# Patient Record
Sex: MALE | Race: WHITE | Employment: UNEMPLOYED | URBAN - METROPOLITAN AREA
[De-identification: names, ages, dates, MRNs, and addresses within clinical notes are randomized per-mention and may not be internally consistent; named-entity substitution may affect disease eponyms.]

---

## 2017-12-04 ENCOUNTER — HOSPITAL ENCOUNTER (OUTPATIENT)
Dept: OTHER | Age: 22
Discharge: HOME | End: 2017-12-04
Attending: NURSE PRACTITIONER | Admitting: NURSE PRACTITIONER

## 2017-12-04 DIAGNOSIS — M25.561 RIGHT KNEE PAIN, UNSPECIFIED CHRONICITY: ICD-10-CM

## 2018-07-10 ENCOUNTER — HOSPITAL ENCOUNTER (OUTPATIENT)
Dept: GENERAL RADIOLOGY | Age: 23
Discharge: HOME | End: 2018-07-10
Attending: PSYCHIATRY & NEUROLOGY | Admitting: PSYCHIATRY & NEUROLOGY

## 2018-07-19 LAB — MISCELLANEOUS LAB TEST ORDER: NORMAL

## 2021-04-22 ENCOUNTER — HOSPITAL ENCOUNTER (EMERGENCY)
Age: 26
Discharge: HOME | End: 2021-04-22
Attending: EMERGENCY MEDICINE
Payer: MEDICAID

## 2021-04-22 ENCOUNTER — APPOINTMENT (OUTPATIENT)
Dept: GENERAL RADIOLOGY | Age: 26
End: 2021-04-22
Payer: MEDICAID

## 2021-04-22 VITALS
TEMPERATURE: 98.2 F | WEIGHT: 135 LBS | RESPIRATION RATE: 16 BRPM | HEIGHT: 67 IN | BODY MASS INDEX: 21.19 KG/M2 | HEART RATE: 76 BPM | SYSTOLIC BLOOD PRESSURE: 128 MMHG | DIASTOLIC BLOOD PRESSURE: 83 MMHG | OXYGEN SATURATION: 98 %

## 2021-04-22 DIAGNOSIS — R07.89 ATYPICAL CHEST PAIN: Primary | ICD-10-CM

## 2021-04-22 LAB
A/G RATIO: 2.5 (ref 1.1–2.2)
ALBUMIN SERPL-MCNC: 4.7 G/DL (ref 3.4–5)
ALP BLD-CCNC: 60 U/L (ref 40–129)
ALT SERPL-CCNC: 30 U/L (ref 10–40)
ANION GAP SERPL CALCULATED.3IONS-SCNC: 6 MMOL/L (ref 3–16)
AST SERPL-CCNC: 19 U/L (ref 15–37)
BASOPHILS ABSOLUTE: 0 K/UL (ref 0–0.2)
BASOPHILS RELATIVE PERCENT: 0.5 %
BILIRUB SERPL-MCNC: 0.3 MG/DL (ref 0–1)
BUN BLDV-MCNC: 9 MG/DL (ref 7–20)
CALCIUM SERPL-MCNC: 9.1 MG/DL (ref 8.3–10.6)
CHLORIDE BLD-SCNC: 106 MMOL/L (ref 99–110)
CO2: 29 MMOL/L (ref 21–32)
CREAT SERPL-MCNC: 0.8 MG/DL (ref 0.9–1.3)
EOSINOPHILS ABSOLUTE: 0 K/UL (ref 0–0.6)
EOSINOPHILS RELATIVE PERCENT: 0.3 %
GFR AFRICAN AMERICAN: >60
GFR NON-AFRICAN AMERICAN: >60
GLOBULIN: 1.9 G/DL
GLUCOSE BLD-MCNC: 95 MG/DL (ref 70–99)
HCT VFR BLD CALC: 43.1 % (ref 40.5–52.5)
HEMOGLOBIN: 14.7 G/DL (ref 13.5–17.5)
LYMPHOCYTES ABSOLUTE: 1.9 K/UL (ref 1–5.1)
LYMPHOCYTES RELATIVE PERCENT: 25.9 %
MCH RBC QN AUTO: 30.6 PG (ref 26–34)
MCHC RBC AUTO-ENTMCNC: 34 G/DL (ref 31–36)
MCV RBC AUTO: 89.8 FL (ref 80–100)
MONOCYTES ABSOLUTE: 0.3 K/UL (ref 0–1.3)
MONOCYTES RELATIVE PERCENT: 4.4 %
NEUTROPHILS ABSOLUTE: 5.2 K/UL (ref 1.7–7.7)
NEUTROPHILS RELATIVE PERCENT: 68.9 %
PDW BLD-RTO: 12.7 % (ref 12.4–15.4)
PLATELET # BLD: 226 K/UL (ref 135–450)
PMV BLD AUTO: 8.4 FL (ref 5–10.5)
POTASSIUM SERPL-SCNC: 3.6 MMOL/L (ref 3.5–5.1)
RBC # BLD: 4.8 M/UL (ref 4.2–5.9)
SODIUM BLD-SCNC: 141 MMOL/L (ref 136–145)
TOTAL PROTEIN: 6.6 G/DL (ref 6.4–8.2)
TROPONIN: <0.01 NG/ML
WBC # BLD: 7.5 K/UL (ref 4–11)

## 2021-04-22 PROCEDURE — 96374 THER/PROPH/DIAG INJ IV PUSH: CPT

## 2021-04-22 PROCEDURE — 93005 ELECTROCARDIOGRAM TRACING: CPT | Performed by: EMERGENCY MEDICINE

## 2021-04-22 PROCEDURE — 71046 X-RAY EXAM CHEST 2 VIEWS: CPT

## 2021-04-22 PROCEDURE — 85025 COMPLETE CBC W/AUTO DIFF WBC: CPT

## 2021-04-22 PROCEDURE — 99284 EMERGENCY DEPT VISIT MOD MDM: CPT

## 2021-04-22 PROCEDURE — 80053 COMPREHEN METABOLIC PANEL: CPT

## 2021-04-22 PROCEDURE — 84484 ASSAY OF TROPONIN QUANT: CPT

## 2021-04-22 PROCEDURE — 6360000002 HC RX W HCPCS: Performed by: EMERGENCY MEDICINE

## 2021-04-22 RX ORDER — CETIRIZINE HYDROCHLORIDE 10 MG/1
10 TABLET ORAL DAILY
COMMUNITY

## 2021-04-22 RX ORDER — ARIPIPRAZOLE 10 MG/1
10 TABLET ORAL DAILY
COMMUNITY

## 2021-04-22 RX ORDER — OXYBUTYNIN CHLORIDE 5 MG/1
2.5 TABLET ORAL 2 TIMES DAILY
COMMUNITY

## 2021-04-22 RX ORDER — CLONAZEPAM 1 MG/1
1 TABLET ORAL 2 TIMES DAILY PRN
COMMUNITY

## 2021-04-22 RX ORDER — CLONIDINE 0.2 MG/24H
1 PATCH, EXTENDED RELEASE TRANSDERMAL WEEKLY
COMMUNITY

## 2021-04-22 RX ORDER — TRAZODONE HYDROCHLORIDE 50 MG/1
50 TABLET ORAL NIGHTLY
COMMUNITY

## 2021-04-22 RX ORDER — KETOROLAC TROMETHAMINE 30 MG/ML
30 INJECTION, SOLUTION INTRAMUSCULAR; INTRAVENOUS ONCE
Status: COMPLETED | OUTPATIENT
Start: 2021-04-22 | End: 2021-04-22

## 2021-04-22 RX ORDER — FLUTICASONE PROPIONATE 110 UG/1
1 AEROSOL, METERED RESPIRATORY (INHALATION) 2 TIMES DAILY
COMMUNITY

## 2021-04-22 RX ADMIN — KETOROLAC TROMETHAMINE 30 MG: 30 INJECTION, SOLUTION INTRAMUSCULAR; INTRAVENOUS at 18:23

## 2021-04-22 ASSESSMENT — PAIN SCALES - GENERAL: PAINLEVEL_OUTOF10: 6

## 2021-04-22 ASSESSMENT — PAIN DESCRIPTION - DESCRIPTORS: DESCRIPTORS: BURNING

## 2021-04-22 ASSESSMENT — HEART SCORE: ECG: 0

## 2021-04-22 ASSESSMENT — PAIN DESCRIPTION - PAIN TYPE: TYPE: ACUTE PAIN

## 2021-04-22 NOTE — ED NOTES
Pt ok to d/c to home. Pt given d/c instructions. Pt verbalized understating including Rx and follow up care. Pt ambulated to lobby for ride home.  0 s/s of distress at time of d/c.        Daina Westfall RN  04/22/21 8624

## 2021-04-22 NOTE — ED NOTES
Bed: 21  Expected date:   Expected time:   Means of arrival:   Comments:  [facility]  04/22/21 3382

## 2021-04-22 NOTE — ED PROVIDER NOTES
CHIEF COMPLAINT  Chest Pain (chest pain/burning sensation in chest x 3-4 hours )      HISTORY OF PRESENT ILLNESS  lAonso Menjivar is a 26 y.o. male with a history of asthma and ADHD who presents to the ED complaining of chest pain. Patient reports onset of subjective numbness and tingling of his entire face and bilateral chest wall 3 to 4 hours prior to arrival.  Subsequently developed substernal chest discomfort described as burning, 6 out of 10 in intensity. Denies any exacerbating or relieving factors. No prior cardiac history although states he does have a positive family history. Denies diaphoresis, palpitations, near syncope, nausea, vomiting, fever, chills, abdominal pain. No report of recent illness or trauma. States he has been under significant stress since being fired from his job. No other complaints, modifying factors or associated symptoms. I have reviewed the following from the nursing documentation. Past Medical History:   Diagnosis Date    ADHD (attention deficit hyperactivity disorder)     Asthma     [place]'s disease ([facility] [address]      Past Surgical History:   Procedure Laterality Date    HERNIA REPAIR       No family history on file.   Social History     Socioeconomic History    Marital status: Single     Spouse name: Not on file    Number of children: Not on file    Years of education: Not on file    Highest education level: Not on file   Occupational History    Not on file   Social Needs    Financial resource strain: Not on file    Food insecurity     Worry: Not on file     Inability: Not on file    Transportation needs     Medical: Not on file     Non-medical: Not on file   Tobacco Use    Smoking status: Never Smoker    Smokeless tobacco: Never Used   Substance and Sexual Activity    Alcohol use: No    Drug use: No    Sexual activity: Not on file   Lifestyle    Physical activity     Days per week: Not on file     Minutes per session: Not on file    Stress: Not on file   Relationships    Social connections     Talks on phone: Not on file     Gets together: Not on file     Attends Amish service: Not on file     Active member of club or organization: Not on file     Attends meetings of clubs or organizations: Not on file     Relationship status: Not on file    Intimate partner violence     Fear of current or ex partner: Not on file     Emotionally abused: Not on file     Physically abused: Not on file     Forced sexual activity: Not on file   Other Topics Concern    Not on file   Social History Narrative    ** Merged History Encounter **          No current facility-administered medications for this encounter. Current Outpatient Medications   Medication Sig Dispense Refill    traZODone (DESYREL) 50 MG tablet Take 50 mg by mouth nightly      fluticasone (FLOVENT HFA) 110 MCG/ACT inhaler Inhale 1 puff into the lungs 2 times daily      oxybutynin (DITROPAN) 5 MG tablet Take 2.5 mg by mouth 2 times daily      ARIPiprazole (ABILIFY) 10 MG tablet Take 10 mg by mouth daily      cloNIDine (CATAPRES) 0.2 MG/24HR PTWK Place 1 patch onto the skin once a week      clonazePAM (KLONOPIN) 1 MG tablet Take 1 mg by mouth 2 times daily as needed.  diclofenac (VOLTAREN) 50 MG EC tablet Take 1 tablet by mouth 2 times daily 14 tablet 0    Dexmethylphenidate HCl (FOCALIN PO) Take 1 tablet by mouth 2 times daily. 30mg daily and 5mg bid      OXcarbazepine (TRILEPTAL) 150 MG tablet Take 600 mg by mouth 2 times daily 450mg bid      albuterol (PROVENTIL HFA;VENTOLIN HFA) 108 (90 BASE) MCG/ACT inhaler Inhale 2 puffs into the lungs every 6 hours as needed for Wheezing.  cetirizine (ZYRTEC) 10 MG tablet Take 10 mg by mouth daily       Allergies   Allergen Reactions    Other      Ucerine Cream        REVIEW OF SYSTEMS  10 systems reviewed, pertinent positives per HPI otherwise noted to be negative.     PHYSICAL EXAM  BP (!) 144/79   Pulse 74   Temp 98.2 °F (36.8 °C) (Oral)   Resp 16   Ht 5' 7\" (1.702 m)   Wt 135 lb (61.2 kg)   SpO2 100%   BMI 21.14 kg/m²   GENERAL APPEARANCE: Awake and alert. Cooperative. No acute distress. HEAD: Normocephalic. Atraumatic. EYES: PERRL. EOM's grossly intact. ENT: Mucous membranes are moist.   NECK: Supple, trachea midline. HEART: RRR. Normal S1S2, no rubs, gallops, or murmurs noted. Mild anterior chest wall tenderness. LUNGS: Respirations unlabored. CTAB. Good air exchange. No wheezes, rales, or rhonchi. Speaking comfortably in full sentences. ABDOMEN: Soft. Non-distended. Non-tender. No guarding or rebound. Normal bowel sounds. EXTREMITIES: No peripheral edema. MAEE. No acute deformities. SKIN: Warm and dry. No acute rashes. NEUROLOGICAL: Alert and oriented X 3. CN II-XII intact. No gross facial drooping. Strength 5/5, sensation intact. Normal coordination. No pronator drift. No truncal instability. Gait normal.   PSYCHIATRIC: Slightly anxious, somewhat bizarre affect. LABS  I have reviewed all labs for this visit.    Results for orders placed or performed during the hospital encounter of 04/22/21   CBC auto differential   Result Value Ref Range    WBC 7.5 4.0 - 11.0 K/uL    RBC 4.80 4.20 - 5.90 M/uL    Hemoglobin 14.7 13.5 - 17.5 g/dL    Hematocrit 43.1 40.5 - 52.5 %    MCV 89.8 80.0 - 100.0 fL    MCH 30.6 26.0 - 34.0 pg    MCHC 34.0 31.0 - 36.0 g/dL    RDW 12.7 12.4 - 15.4 %    Platelets 419 727 - 471 K/uL    MPV 8.4 5.0 - 10.5 fL    Neutrophils % 68.9 %    Lymphocytes % 25.9 %    Monocytes % 4.4 %    Eosinophils % 0.3 %    Basophils % 0.5 %    Neutrophils Absolute 5.2 1.7 - 7.7 K/uL    Lymphocytes Absolute 1.9 1.0 - 5.1 K/uL    Monocytes Absolute 0.3 0.0 - 1.3 K/uL    Eosinophils Absolute 0.0 0.0 - 0.6 K/uL    Basophils Absolute 0.0 0.0 - 0.2 K/uL   Comprehensive metabolic panel   Result Value Ref Range    Sodium 141 136 - 145 mmol/L    Potassium 3.6 3.5 - 5.1 mmol/L    Chloride 106 99 - 110 mmol/L    CO2 29 21 - 32 mmol/L    Anion Gap 6 3 - 16    Glucose 95 70 - 99 mg/dL    BUN 9 7 - 20 mg/dL    CREATININE 0.8 (L) 0.9 - 1.3 mg/dL    GFR Non-African American >60 >60    GFR African American >60 >60    Calcium 9.1 8.3 - 10.6 mg/dL    Total Protein 6.6 6.4 - 8.2 g/dL    Albumin 4.7 3.4 - 5.0 g/dL    Albumin/Globulin Ratio 2.5 (H) 1.1 - 2.2    Total Bilirubin 0.3 0.0 - 1.0 mg/dL    Alkaline Phosphatase 60 40 - 129 U/L    ALT 30 10 - 40 U/L    AST 19 15 - 37 U/L    Globulin 1.9 g/dL   Troponin   Result Value Ref Range    Troponin <0.01 <0.01 ng/mL   EKG 12 Lead   Result Value Ref Range    Ventricular Rate 82 BPM    Atrial Rate 82 BPM    P-R Interval 140 ms    QRS Duration 102 ms    Q-T Interval 358 ms    QTc Calculation (Bazett) 418 ms    P Axis 68 degrees    R Axis 93 degrees    T Axis 70 degrees    Diagnosis       Normal sinus rhythmRightward axisPulmonary disease patternIncomplete right bundle branch blockAbnormal ECGNo previous ECGs available       EKG  Normal sinus rhythm, rate 82, rightward axis, QTC within normal limits, partial right bundle pattern, no acute ST changes or T wave inversions, no priors available for comparison. RADIOLOGY  X-RAYS:  I have reviewed radiologic plain film image(s). ALL OTHER NON-PLAIN FILM IMAGES SUCH AS CT, ULTRASOUND AND MRI HAVE BEEN READ BY THE RADIOLOGIST. XR CHEST (2 VW)   Final Result   No acute cardiopulmonary disease. Rechecks: Physical assessment performed. Appears comfortable, nontoxic, smiling on reevaluation. ED COURSE/MDM  Patient seen and evaluated. Old records reviewed. Labs and imaging reviewed and results discussed with patient. Patient anxious in appearance, states he has been under significant stress lately. Heart score of 1 and work-up unremarkable. Feeling much better on reevaluation. Plan for PCP follow-up versus return with any concerns. New Prescriptions    No medications on file       CLINICAL IMPRESSION  1.  Atypical chest pain

## 2021-04-23 LAB
EKG ATRIAL RATE: 82 BPM
EKG DIAGNOSIS: NORMAL
EKG P AXIS: 68 DEGREES
EKG P-R INTERVAL: 140 MS
EKG Q-T INTERVAL: 358 MS
EKG QRS DURATION: 102 MS
EKG QTC CALCULATION (BAZETT): 418 MS
EKG R AXIS: 93 DEGREES
EKG T AXIS: 70 DEGREES
EKG VENTRICULAR RATE: 82 BPM

## 2021-04-23 PROCEDURE — 93010 ELECTROCARDIOGRAM REPORT: CPT | Performed by: INTERNAL MEDICINE

## 2021-04-28 ENCOUNTER — HOSPITAL ENCOUNTER (EMERGENCY)
Age: 26
Discharge: HOME | End: 2021-04-28
Attending: EMERGENCY MEDICINE
Payer: MEDICAID

## 2021-04-28 VITALS
RESPIRATION RATE: 14 BRPM | HEART RATE: 66 BPM | OXYGEN SATURATION: 97 % | DIASTOLIC BLOOD PRESSURE: 86 MMHG | SYSTOLIC BLOOD PRESSURE: 122 MMHG | TEMPERATURE: 98 F

## 2021-04-28 DIAGNOSIS — R10.13 DYSPEPSIA: Primary | ICD-10-CM

## 2021-04-28 PROCEDURE — 6370000000 HC RX 637 (ALT 250 FOR IP): Performed by: EMERGENCY MEDICINE

## 2021-04-28 PROCEDURE — 99283 EMERGENCY DEPT VISIT LOW MDM: CPT

## 2021-04-28 PROCEDURE — 99284 EMERGENCY DEPT VISIT MOD MDM: CPT

## 2021-04-28 PROCEDURE — 93005 ELECTROCARDIOGRAM TRACING: CPT | Performed by: EMERGENCY MEDICINE

## 2021-04-28 RX ADMIN — LIDOCAINE HYDROCHLORIDE: 20 SOLUTION ORAL; TOPICAL at 02:17

## 2021-04-28 ASSESSMENT — ENCOUNTER SYMPTOMS
COUGH: 0
BACK PAIN: 0
SHORTNESS OF BREATH: 0
NAUSEA: 0
VOMITING: 0
RHINORRHEA: 0

## 2021-04-28 NOTE — ED PROVIDER NOTES
[facility]  ED  EMERGENCY DEPARTMENT ENCOUNTER      Pt Name: Mary Womack  MRN: [id number]  [place] 1995  Date of evaluation: 4/28/2021  Provider: Roslyn Zamorano MD    CHIEF COMPLAINT       Chief Complaint   Patient presents with    Chest Pain     was here a couple days ago for the same thing. states it is pain then burning. thinks it is stress related and does not want an IV         HISTORY OF PRESENT ILLNESS   (Location/Symptom, Timing/Onset,Context/Setting, Quality, Duration, Modifying Factors, Severity)  Note limiting factors. Mary Womack is a 26 y.o. male who presents to the emergency department for chest pain. The patient states that he feels inside of him is on fire he describes as burning pain he points from the epigastric area and across the chest.. Has been going on for a few hours. He states about an hour prior to the onset he had some fried foods. He had similar symptoms a few days ago when he was told that everything was fine however the pain returned and that concerned him. He did state that he was under a lot of stress lately. Nursing notes were reviewed. REVIEW OF SYSTEMS    (2-9 systems for level 4, 10 or more for level 5)     Review of Systems   Constitutional: Negative for fever. HENT: Negative for rhinorrhea. Eyes: Negative for visual disturbance. Respiratory: Negative for cough and shortness of breath. Gastrointestinal: Negative for nausea and vomiting. Endocrine: Negative for polyuria. Genitourinary: Positive for frequency. Negative for dysuria. Musculoskeletal: Negative for back pain. Skin: Negative for rash. Hematological: Does not bruise/bleed easily.          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[facility] [address]          SURGICALHISTORY       Past Surgical History:   Procedure Laterality Date    HERNIA REPAIR CURRENT MEDICATIONS       Discharge Medication List as of 4/28/2021  3:58 AM      CONTINUE these medications which have NOT CHANGED    Details   traZODone (DESYREL) 50 MG tablet Take 50 mg by mouth nightlyHistorical Med      fluticasone (FLOVENT HFA) 110 MCG/ACT inhaler Inhale 1 puff into the lungs 2 times dailyHistorical Med      oxybutynin (DITROPAN) 5 MG tablet Take 2.5 mg by mouth 2 times dailyHistorical Med      ARIPiprazole (ABILIFY) 10 MG tablet Take 10 mg by mouth dailyHistorical Med      cloNIDine (CATAPRES) 0.2 MG/24HR PTWK Place 1 patch onto the skin once a weekHistorical Med      clonazePAM (KLONOPIN) 1 MG tablet Take 1 mg by mouth 2 times daily as needed. Historical Med      diclofenac (VOLTAREN) 50 MG EC tablet Take 1 tablet by mouth 2 times daily, Disp-14 tablet, R-0Print      Dexmethylphenidate HCl (FOCALIN PO) Take 1 tablet by mouth 2 times daily. 30mg daily and 5mg bid      OXcarbazepine (TRILEPTAL) 150 MG tablet Take 600 mg by mouth 2 times daily 450mg bidHistorical Med      albuterol (PROVENTIL HFA;VENTOLIN HFA) 108 (90 BASE) MCG/ACT inhaler Inhale 2 puffs into the lungs every 6 hours as needed for Wheezing. cetirizine (ZYRTEC) 10 MG tablet Take 10 mg by mouth dailyHistorical Med             ALLERGIES     Other    FAMILY HISTORY     History reviewed. No pertinent family history.        SOCIAL HISTORY       Social History     Socioeconomic History    Marital status: Single     Spouse name: None    Number of children: None    Years of education: None    Highest education level: None   Occupational History    None   Social Needs    Financial resource strain: None    Food insecurity     Worry: None     Inability: None    Transportation needs     Medical: None     Non-medical: None   Tobacco Use    Smoking status: Never Smoker    Smokeless tobacco: Never Used   Substance and Sexual Activity    Alcohol use: No    Drug use: No    Sexual activity: None   Lifestyle    Physical activity     Days per week: None     Minutes per session: None    Stress: None   Relationships    Social connections     Talks on phone: None     Gets together: None     Attends Adventist service: None     Active member of club or organization: None     Attends meetings of clubs or organizations: None     Relationship status: None    Intimate partner violence     Fear of current or ex partner: None     Emotionally abused: None     Physically abused: None     Forced sexual activity: None   Other Topics Concern    None   Social History Narrative    ** Merged History Encounter **            SCREENINGS             PHYSICAL EXAM    (up to 7 for level 4, 8 or more for level 5)     ED Triage Vitals [04/28/21 0140]   BP Temp Temp Source Pulse Resp SpO2 Height Weight   (!) 132/104 98 °F (36.7 °C) Oral 74 16 96 % -- --       Physical Exam  Vitals signs and nursing note reviewed. Constitutional:       Appearance: Normal appearance. He is well-developed. He is not ill-appearing. HENT:      Head: Normocephalic and atraumatic. Right Ear: External ear normal.      Left Ear: External ear normal.      Nose: Nose normal.   Eyes:      General: No scleral icterus. Right eye: No discharge. Left eye: No discharge. Conjunctiva/sclera: Conjunctivae normal.   Neck:      Musculoskeletal: Neck supple. Cardiovascular:      Rate and Rhythm: Normal rate and regular rhythm. Heart sounds: Normal heart sounds. Pulmonary:      Effort: Pulmonary effort is normal. No respiratory distress. Breath sounds: Normal breath sounds. No wheezing or rales. Abdominal:      General: Bowel sounds are normal. There is no distension. Palpations: Abdomen is soft. Tenderness: There is abdominal tenderness. There is no guarding or rebound. Comments: Mild epigastric tenderness with no rebound rigidity or guarding. No masses appreciated. No hernias identified. Skin:     Coloration: Skin is not pale. Neurological:      Mental Status: He is alert. Comments: Patient has tics which is normal for him with his [place]'s disease. Psychiatric:      Comments: Anxious             DIAGNOSTIC RESULTS     EKG: All EKG's are interpreted by the Emergency Department Physician who either signs or Co-signs this chart in the absence of a cardiologist.    12 lead EKG shows normal sinus rhythm at a rate of 79 bpm comparing to both QRS QTC normal.  Normal axis no acute ischemic changes. No significant changes from prior EKG on 22 April. RADIOLOGY:   Non-plain film images such as CT, Ultrasound and MRI are read by the radiologist. Plain radiographic images are visualized and preliminarily interpreted by the emergency physician with the below findings:        Interpretation per the Radiologist below, if available at the time of this note:    No orders to display         ED BEDSIDE ULTRASOUND:   Performed by ED Physician - none    LABS:  Labs Reviewed - No data to display    All other labs were within normal range or not returned as of this dictation. EMERGENCY DEPARTMENT COURSE and DIFFERENTIAL DIAGNOSIS/MDM:   Vitals:    Vitals:    04/28/21 0140 04/28/21 0400   BP: (!) 132/104 122/86   Pulse: 74 66   Resp: 16 14   Temp: 98 °F (36.7 °C) 98 °F (36.7 °C)   TempSrc: Oral Oral   SpO2: 96% 97%       Adult male who comes in of burning abdominal and chest pain. The patient is given a GI cocktail. An EKG was ordered and had no acute changes. The patient's chart is reviewed and just a few days ago he had a negative cardiac work-up. Patient is low risk. Patient is reassessed after the GI cocktail his symptoms have resolved he is now resting comfortably. Patient will be discharged I encourage close follow-up in the primary care setting. CRITICAL CARE TIME   None       CONSULTS:  None    PROCEDURES:       Procedures    FINAL IMPRESSION      1.  Dyspepsia          DISPOSITION/PLAN   DISPOSITION Decision To Discharge 04/28/2021 02:55:07 AM      PATIENT REFERREDTO:  Jyoti Grace, APRN - CNP  [address] [address]  [phone]    Schedule an appointment as soon as possible for a visit in 2 days        DISCHARGEMEDICATIONS:  Discharge Medication List as of 4/28/2021  3:58 AM      START taking these medications    Details   Famotidine-Ca Carb-Mag Hydrox -165 MG CHEW Take 1 tablet by mouth daily for 14 days, Disp-14 tablet, R-0Normal                (Please note that portions of this note were completed with a voice recognition program.  Efforts were made to edit the dictations but occasionally words are mis-transcribed.)    Nani Zayas MD (electronically signed)  Attending Emergency Physician        Nani Zayas MD  04/28/21 5972

## 2021-04-28 NOTE — ED NOTES
Bed: 17  Expected date:   Expected time:   Means of arrival:   Comments:  squad Linzie Buerger, RN  04/28/21 5819

## 2021-04-28 NOTE — ED NOTES
Patient given cab voucher with approval from [id number] [facility].  Patient went to lobby to wait for cab at [address] Marcos, RN  04/28/21 0898

## 2021-04-29 LAB
EKG ATRIAL RATE: 79 BPM
EKG DIAGNOSIS: NORMAL
EKG P AXIS: 70 DEGREES
EKG P-R INTERVAL: 142 MS
EKG Q-T INTERVAL: 350 MS
EKG QRS DURATION: 96 MS
EKG QTC CALCULATION (BAZETT): 401 MS
EKG R AXIS: 84 DEGREES
EKG T AXIS: 64 DEGREES
EKG VENTRICULAR RATE: 79 BPM

## 2021-04-29 PROCEDURE — 93010 ELECTROCARDIOGRAM REPORT: CPT | Performed by: INTERNAL MEDICINE

## 2021-06-07 ENCOUNTER — HOSPITAL ENCOUNTER (EMERGENCY)
Age: 26
Discharge: HOME | End: 2021-06-07
Payer: MEDICAID

## 2021-06-07 VITALS
HEART RATE: 75 BPM | HEIGHT: 67 IN | DIASTOLIC BLOOD PRESSURE: 73 MMHG | BODY MASS INDEX: 21.66 KG/M2 | TEMPERATURE: 98.1 F | WEIGHT: 138 LBS | OXYGEN SATURATION: 99 % | SYSTOLIC BLOOD PRESSURE: 121 MMHG | RESPIRATION RATE: 14 BRPM

## 2021-11-15 ENCOUNTER — CLINICAL DOCUMENTATION (OUTPATIENT)
Dept: OTHER | Age: 26
End: 2021-11-15

## 2023-02-06 ENCOUNTER — HOSPITAL ENCOUNTER (INPATIENT)
Age: 28
LOS: 3 days | Discharge: HOME | DRG: 751 | End: 2023-02-09
Attending: PSYCHIATRY & NEUROLOGY | Admitting: PSYCHIATRY & NEUROLOGY
Payer: MEDICAID

## 2023-02-06 DIAGNOSIS — F22 PARANOID DELUSION (HCC): Primary | ICD-10-CM

## 2023-02-06 PROBLEM — F29 PSYCHOSIS, UNSPECIFIED PSYCHOSIS TYPE (HCC): Status: ACTIVE | Noted: 2023-02-06

## 2023-02-06 PROBLEM — G10 HUNTINGTON'S CHOREA (HCC): Status: ACTIVE | Noted: 2023-02-06

## 2023-02-06 LAB
A/G RATIO: 2.7 (ref 1.1–2.2)
ACETAMINOPHEN LEVEL: <5 UG/ML (ref 10–30)
ALBUMIN SERPL-MCNC: 4.6 G/DL (ref 3.4–5)
ALP BLD-CCNC: 112 U/L (ref 40–129)
ALT SERPL-CCNC: 17 U/L (ref 10–40)
AMPHETAMINE SCREEN, URINE: NORMAL
ANION GAP SERPL CALCULATED.3IONS-SCNC: 7 MMOL/L (ref 3–16)
AST SERPL-CCNC: 14 U/L (ref 15–37)
BARBITURATE SCREEN URINE: NORMAL
BASOPHILS ABSOLUTE: 0 K/UL (ref 0–0.2)
BASOPHILS RELATIVE PERCENT: 0.5 %
BENZODIAZEPINE SCREEN, URINE: NORMAL
BILIRUB SERPL-MCNC: <0.2 MG/DL (ref 0–1)
BUN BLDV-MCNC: 9 MG/DL (ref 7–20)
CALCIUM SERPL-MCNC: 8.9 MG/DL (ref 8.3–10.6)
CANNABINOID SCREEN URINE: NORMAL
CHLORIDE BLD-SCNC: 103 MMOL/L (ref 99–110)
CO2: 27 MMOL/L (ref 21–32)
COCAINE METABOLITE SCREEN URINE: NORMAL
CREAT SERPL-MCNC: 0.9 MG/DL (ref 0.9–1.3)
EOSINOPHILS ABSOLUTE: 0.1 K/UL (ref 0–0.6)
EOSINOPHILS RELATIVE PERCENT: 2.3 %
ETHANOL: NORMAL MG/DL (ref 0–0.08)
FENTANYL SCREEN, URINE: NORMAL
GFR SERPL CREATININE-BSD FRML MDRD: >60 ML/MIN/{1.73_M2}
GLUCOSE BLD-MCNC: 111 MG/DL (ref 70–99)
HCT VFR BLD CALC: 35.8 % (ref 40.5–52.5)
HEMOGLOBIN: 12.2 G/DL (ref 13.5–17.5)
INFLUENZA A: NOT DETECTED
INFLUENZA B: NOT DETECTED
LITHIUM DOSE AMOUNT: NORMAL
LITHIUM LEVEL: 0.8 MMOL/L (ref 0.6–1.2)
LYMPHOCYTES ABSOLUTE: 1.6 K/UL (ref 1–5.1)
LYMPHOCYTES RELATIVE PERCENT: 26.3 %
Lab: NORMAL
MAGNESIUM: 2.1 MG/DL (ref 1.8–2.4)
MCH RBC QN AUTO: 29.9 PG (ref 26–34)
MCHC RBC AUTO-ENTMCNC: 34.1 G/DL (ref 31–36)
MCV RBC AUTO: 87.7 FL (ref 80–100)
METHADONE SCREEN, URINE: NORMAL
MONOCYTES ABSOLUTE: 0.3 K/UL (ref 0–1.3)
MONOCYTES RELATIVE PERCENT: 5.2 %
NEUTROPHILS ABSOLUTE: 3.9 K/UL (ref 1.7–7.7)
NEUTROPHILS RELATIVE PERCENT: 65.7 %
OPIATE SCREEN URINE: NORMAL
OXYCODONE URINE: NORMAL
PDW BLD-RTO: 12.7 % (ref 12.4–15.4)
PH UA: 7
PHENCYCLIDINE SCREEN URINE: NORMAL
PLATELET # BLD: 261 K/UL (ref 135–450)
PMV BLD AUTO: 8.4 FL (ref 5–10.5)
POTASSIUM REFLEX MAGNESIUM: 3.4 MMOL/L (ref 3.5–5.1)
RBC # BLD: 4.09 M/UL (ref 4.2–5.9)
SALICYLATE, SERUM: <0.3 MG/DL (ref 15–30)
SARS-COV-2 RNA, RT PCR: NOT DETECTED
SODIUM BLD-SCNC: 137 MMOL/L (ref 136–145)
TOTAL PROTEIN: 6.3 G/DL (ref 6.4–8.2)
TSH SERPL DL<=0.05 MIU/L-ACNC: 0.91 UIU/ML (ref 0.27–4.2)
WBC # BLD: 6 K/UL (ref 4–11)

## 2023-02-06 PROCEDURE — 80307 DRUG TEST PRSMV CHEM ANLYZR: CPT

## 2023-02-06 PROCEDURE — 6370000000 HC RX 637 (ALT 250 FOR IP)

## 2023-02-06 PROCEDURE — 36415 COLL VENOUS BLD VENIPUNCTURE: CPT

## 2023-02-06 PROCEDURE — 80143 DRUG ASSAY ACETAMINOPHEN: CPT

## 2023-02-06 PROCEDURE — 1240000000 HC EMOTIONAL WELLNESS R&B

## 2023-02-06 PROCEDURE — 6370000000 HC RX 637 (ALT 250 FOR IP): Performed by: NURSE PRACTITIONER

## 2023-02-06 PROCEDURE — 82077 ASSAY SPEC XCP UR&BREATH IA: CPT

## 2023-02-06 PROCEDURE — 85025 COMPLETE CBC W/AUTO DIFF WBC: CPT

## 2023-02-06 PROCEDURE — 80179 DRUG ASSAY SALICYLATE: CPT

## 2023-02-06 PROCEDURE — 83735 ASSAY OF MAGNESIUM: CPT

## 2023-02-06 PROCEDURE — 80053 COMPREHEN METABOLIC PANEL: CPT

## 2023-02-06 PROCEDURE — 80178 ASSAY OF LITHIUM: CPT

## 2023-02-06 PROCEDURE — 84443 ASSAY THYROID STIM HORMONE: CPT

## 2023-02-06 PROCEDURE — 99285 EMERGENCY DEPT VISIT HI MDM: CPT

## 2023-02-06 PROCEDURE — 87636 SARSCOV2 & INF A&B AMP PRB: CPT

## 2023-02-06 PROCEDURE — 80183 DRUG SCRN QUANT OXCARBAZEPIN: CPT

## 2023-02-06 PROCEDURE — 80061 LIPID PANEL: CPT

## 2023-02-06 RX ORDER — ACETAMINOPHEN 325 MG/1
650 TABLET ORAL EVERY 4 HOURS PRN
Status: DISCONTINUED | OUTPATIENT
Start: 2023-02-06 | End: 2023-02-09 | Stop reason: HOSPADM

## 2023-02-06 RX ORDER — TRAZODONE HYDROCHLORIDE 50 MG/1
25 TABLET ORAL NIGHTLY
Status: DISCONTINUED | OUTPATIENT
Start: 2023-02-06 | End: 2023-02-09 | Stop reason: HOSPADM

## 2023-02-06 RX ORDER — OXYBUTYNIN CHLORIDE 5 MG/1
2.5 TABLET ORAL 2 TIMES DAILY
Status: DISCONTINUED | OUTPATIENT
Start: 2023-02-06 | End: 2023-02-09 | Stop reason: HOSPADM

## 2023-02-06 RX ORDER — MAGNESIUM HYDROXIDE/ALUMINUM HYDROXICE/SIMETHICONE 120; 1200; 1200 MG/30ML; MG/30ML; MG/30ML
30 SUSPENSION ORAL EVERY 6 HOURS PRN
Status: DISCONTINUED | OUTPATIENT
Start: 2023-02-06 | End: 2023-02-09 | Stop reason: HOSPADM

## 2023-02-06 RX ORDER — OXCARBAZEPINE 300 MG/1
300 TABLET, FILM COATED ORAL 2 TIMES DAILY
COMMUNITY
Start: 2023-02-02

## 2023-02-06 RX ORDER — METHYLPHENIDATE HYDROCHLORIDE 10 MG/1
20 TABLET ORAL 2 TIMES DAILY WITH MEALS
Status: DISCONTINUED | OUTPATIENT
Start: 2023-02-07 | End: 2023-02-07

## 2023-02-06 RX ORDER — CLONIDINE HYDROCHLORIDE 0.2 MG/1
0.2 TABLET ORAL NIGHTLY
Status: DISCONTINUED | OUTPATIENT
Start: 2023-02-06 | End: 2023-02-09 | Stop reason: HOSPADM

## 2023-02-06 RX ORDER — DIPHENHYDRAMINE HYDROCHLORIDE 50 MG/ML
50 INJECTION INTRAMUSCULAR; INTRAVENOUS EVERY 4 HOURS PRN
Status: DISCONTINUED | OUTPATIENT
Start: 2023-02-06 | End: 2023-02-09 | Stop reason: HOSPADM

## 2023-02-06 RX ORDER — CLONIDINE HYDROCHLORIDE 0.2 MG/1
0.2 TABLET ORAL DAILY
Status: DISCONTINUED | OUTPATIENT
Start: 2023-02-07 | End: 2023-02-06

## 2023-02-06 RX ORDER — RISPERIDONE 2 MG/1
2 TABLET ORAL DAILY
Status: DISCONTINUED | OUTPATIENT
Start: 2023-02-07 | End: 2023-02-06

## 2023-02-06 RX ORDER — IBUPROFEN 400 MG/1
400 TABLET ORAL 2 TIMES DAILY
Status: DISCONTINUED | OUTPATIENT
Start: 2023-02-06 | End: 2023-02-09 | Stop reason: HOSPADM

## 2023-02-06 RX ORDER — OLANZAPINE 5 MG/1
5 TABLET ORAL EVERY 4 HOURS PRN
Status: DISCONTINUED | OUTPATIENT
Start: 2023-02-06 | End: 2023-02-09 | Stop reason: HOSPADM

## 2023-02-06 RX ORDER — OMEPRAZOLE 20 MG/1
20 CAPSULE, DELAYED RELEASE ORAL
COMMUNITY

## 2023-02-06 RX ORDER — CLONIDINE HYDROCHLORIDE 0.2 MG/1
0.2 TABLET ORAL DAILY
COMMUNITY
Start: 2023-02-02

## 2023-02-06 RX ORDER — LITHIUM CARBONATE 450 MG
450 TABLET, EXTENDED RELEASE ORAL 2 TIMES DAILY
Status: DISCONTINUED | OUTPATIENT
Start: 2023-02-06 | End: 2023-02-09 | Stop reason: HOSPADM

## 2023-02-06 RX ORDER — PANTOPRAZOLE SODIUM 40 MG/1
40 TABLET, DELAYED RELEASE ORAL
Status: DISCONTINUED | OUTPATIENT
Start: 2023-02-07 | End: 2023-02-09 | Stop reason: HOSPADM

## 2023-02-06 RX ORDER — LITHIUM CARBONATE 450 MG
450 TABLET, EXTENDED RELEASE ORAL 2 TIMES DAILY
COMMUNITY
Start: 2023-02-02

## 2023-02-06 RX ORDER — HYDROXYZINE 50 MG/1
50 TABLET, FILM COATED ORAL 3 TIMES DAILY PRN
Status: DISCONTINUED | OUTPATIENT
Start: 2023-02-06 | End: 2023-02-09 | Stop reason: HOSPADM

## 2023-02-06 RX ORDER — LEVOTHYROXINE SODIUM 0.07 MG/1
75 TABLET ORAL DAILY
COMMUNITY
Start: 2023-02-02

## 2023-02-06 RX ORDER — RISPERIDONE 2 MG/1
2 TABLET ORAL NIGHTLY
Status: DISCONTINUED | OUTPATIENT
Start: 2023-02-06 | End: 2023-02-09 | Stop reason: HOSPADM

## 2023-02-06 RX ORDER — POTASSIUM CHLORIDE 20 MEQ/1
20 TABLET, EXTENDED RELEASE ORAL ONCE
Status: COMPLETED | OUTPATIENT
Start: 2023-02-06 | End: 2023-02-06

## 2023-02-06 RX ORDER — RISPERIDONE 2 MG/1
2 TABLET ORAL DAILY
COMMUNITY
Start: 2023-02-02

## 2023-02-06 RX ORDER — OXCARBAZEPINE 300 MG/1
300 TABLET, FILM COATED ORAL 2 TIMES DAILY
Status: DISCONTINUED | OUTPATIENT
Start: 2023-02-06 | End: 2023-02-09 | Stop reason: HOSPADM

## 2023-02-06 RX ORDER — POLYETHYLENE GLYCOL 3350 17 G
2 POWDER IN PACKET (EA) ORAL
Status: DISCONTINUED | OUTPATIENT
Start: 2023-02-06 | End: 2023-02-09 | Stop reason: HOSPADM

## 2023-02-06 RX ORDER — CETIRIZINE HYDROCHLORIDE 10 MG/1
10 TABLET ORAL DAILY
Status: DISCONTINUED | OUTPATIENT
Start: 2023-02-07 | End: 2023-02-09 | Stop reason: HOSPADM

## 2023-02-06 RX ORDER — TRAZODONE HYDROCHLORIDE 50 MG/1
50 TABLET ORAL NIGHTLY PRN
Status: DISCONTINUED | OUTPATIENT
Start: 2023-02-06 | End: 2023-02-09 | Stop reason: HOSPADM

## 2023-02-06 RX ORDER — DEXMETHYLPHENIDATE HYDROCHLORIDE 10 MG/1
20 TABLET ORAL DAILY
Status: DISCONTINUED | OUTPATIENT
Start: 2023-02-07 | End: 2023-02-06

## 2023-02-06 RX ADMIN — OXCARBAZEPINE 300 MG: 300 TABLET, FILM COATED ORAL at 23:05

## 2023-02-06 RX ADMIN — CLONIDINE HYDROCHLORIDE 0.2 MG: 0.2 TABLET ORAL at 23:14

## 2023-02-06 RX ADMIN — RISPERIDONE 2 MG: 2 TABLET ORAL at 23:05

## 2023-02-06 RX ADMIN — LITHIUM CARBONATE 450 MG: 450 TABLET ORAL at 23:05

## 2023-02-06 RX ADMIN — TRAZODONE HYDROCHLORIDE 25 MG: 50 TABLET ORAL at 23:05

## 2023-02-06 RX ADMIN — POTASSIUM CHLORIDE 20 MEQ: 1500 TABLET, EXTENDED RELEASE ORAL at 19:54

## 2023-02-06 RX ADMIN — OXYBUTYNIN CHLORIDE 2.5 MG: 5 TABLET ORAL at 23:05

## 2023-02-06 SDOH — SOCIAL STABILITY: SOCIAL NETWORK: HOW OFTEN DO YOU ATTEND CHURCH OR RELIGIOUS SERVICES?: MORE THAN 4 TIMES PER YEAR

## 2023-02-06 SDOH — SOCIAL STABILITY: SOCIAL NETWORK: HOW OFTEN DO YOU ATTENT MEETINGS OF THE CLUB OR ORGANIZATION YOU BELONG TO?: NEVER

## 2023-02-06 SDOH — SOCIAL STABILITY: SOCIAL INSECURITY
WITHIN THE LAST YEAR, HAVE TO BEEN RAPED OR FORCED TO HAVE ANY KIND OF SEXUAL ACTIVITY BY YOUR PARTNER OR EX-PARTNER?: NO

## 2023-02-06 SDOH — ECONOMIC STABILITY: FOOD INSECURITY: WITHIN THE PAST 12 MONTHS, THE FOOD YOU BOUGHT JUST DIDN'T LAST AND YOU DIDN'T HAVE MONEY TO GET MORE.: NEVER TRUE

## 2023-02-06 SDOH — SOCIAL STABILITY: SOCIAL NETWORK: ARE YOU MARRIED, WIDOWED, DIVORCED, SEPARATED, NEVER MARRIED, OR LIVING WITH A PARTNER?: NEVER MARRIED

## 2023-02-06 SDOH — HEALTH STABILITY: PHYSICAL HEALTH: ON AVERAGE, HOW MANY MINUTES DO YOU ENGAGE IN EXERCISE AT THIS LEVEL?: 0 MIN

## 2023-02-06 SDOH — SOCIAL STABILITY: SOCIAL INSECURITY
WITHIN THE LAST YEAR, HAVE YOU BEEN KICKED, HIT, SLAPPED, OR OTHERWISE PHYSICALLY HURT BY YOUR PARTNER OR EX-PARTNER?: NO

## 2023-02-06 SDOH — SOCIAL STABILITY: SOCIAL NETWORK: IN A TYPICAL WEEK, HOW MANY TIMES DO YOU TALK ON THE PHONE WITH FAMILY, FRIENDS, OR NEIGHBORS?: THREE TIMES A WEEK

## 2023-02-06 SDOH — HEALTH STABILITY: PHYSICAL HEALTH: ON AVERAGE, HOW MANY DAYS PER WEEK DO YOU ENGAGE IN MODERATE TO STRENUOUS EXERCISE (LIKE A BRISK WALK)?: 0 DAYS

## 2023-02-06 SDOH — ECONOMIC STABILITY: HOUSING INSECURITY: IN THE LAST 12 MONTHS, HOW MANY PLACES HAVE YOU LIVED?: 1

## 2023-02-06 SDOH — SOCIAL STABILITY: SOCIAL NETWORK: HOW OFTEN DO YOU GET TOGETHER WITH FRIENDS OR RELATIVES?: NEVER

## 2023-02-06 SDOH — SOCIAL STABILITY: SOCIAL INSECURITY: WITHIN THE LAST YEAR, HAVE YOU BEEN HUMILIATED OR EMOTIONALLY ABUSED IN OTHER WAYS BY YOUR PARTNER OR EX-PARTNER?: NO

## 2023-02-06 SDOH — SOCIAL STABILITY: SOCIAL NETWORK
DO YOU BELONG TO ANY CLUBS OR ORGANIZATIONS SUCH AS CHURCH GROUPS UNIONS, FRATERNAL OR ATHLETIC GROUPS, OR SCHOOL GROUPS?: NO

## 2023-02-06 SDOH — HEALTH STABILITY: MENTAL HEALTH: HOW OFTEN DO YOU HAVE A DRINK CONTAINING ALCOHOL?: NEVER

## 2023-02-06 SDOH — ECONOMIC STABILITY: INCOME INSECURITY: HOW HARD IS IT FOR YOU TO PAY FOR THE VERY BASICS LIKE FOOD, HOUSING, MEDICAL CARE, AND HEATING?: NOT HARD AT ALL

## 2023-02-06 SDOH — ECONOMIC STABILITY: TRANSPORTATION INSECURITY
IN THE PAST 12 MONTHS, HAS LACK OF TRANSPORTATION KEPT YOU FROM MEETINGS, WORK, OR FROM GETTING THINGS NEEDED FOR DAILY LIVING?: NO

## 2023-02-06 SDOH — ECONOMIC STABILITY: INCOME INSECURITY: IN THE LAST 12 MONTHS, WAS THERE A TIME WHEN YOU WERE NOT ABLE TO PAY THE MORTGAGE OR RENT ON TIME?: NO

## 2023-02-06 SDOH — ECONOMIC STABILITY: TRANSPORTATION INSECURITY
IN THE PAST 12 MONTHS, HAS THE LACK OF TRANSPORTATION KEPT YOU FROM MEDICAL APPOINTMENTS OR FROM GETTING MEDICATIONS?: NO

## 2023-02-06 SDOH — SOCIAL STABILITY: SOCIAL INSECURITY: WITHIN THE LAST YEAR, HAVE YOU BEEN AFRAID OF YOUR PARTNER OR EX-PARTNER?: NO

## 2023-02-06 SDOH — ECONOMIC STABILITY: HOUSING INSECURITY
IN THE LAST 12 MONTHS, WAS THERE A TIME WHEN YOU DID NOT HAVE A STEADY PLACE TO SLEEP OR SLEPT IN A SHELTER (INCLUDING NOW)?: NO

## 2023-02-06 SDOH — HEALTH STABILITY: MENTAL HEALTH
STRESS IS WHEN SOMEONE FEELS TENSE, NERVOUS, ANXIOUS, OR CAN'T SLEEP AT NIGHT BECAUSE THEIR MIND IS TROUBLED. HOW STRESSED ARE YOU?: VERY MUCH

## 2023-02-06 SDOH — HEALTH STABILITY: MENTAL HEALTH: HOW MANY STANDARD DRINKS CONTAINING ALCOHOL DO YOU HAVE ON A TYPICAL DAY?: PATIENT DOES NOT DRINK

## 2023-02-06 SDOH — ECONOMIC STABILITY: FOOD INSECURITY: WITHIN THE PAST 12 MONTHS, YOU WORRIED THAT YOUR FOOD WOULD RUN OUT BEFORE YOU GOT MONEY TO BUY MORE.: NEVER TRUE

## 2023-02-06 ASSESSMENT — PAIN - FUNCTIONAL ASSESSMENT
PAIN_FUNCTIONAL_ASSESSMENT: NONE - DENIES PAIN
PAIN_FUNCTIONAL_ASSESSMENT: ACTIVITIES ARE NOT PREVENTED

## 2023-02-06 ASSESSMENT — ENCOUNTER SYMPTOMS
RHINORRHEA: 0
NAUSEA: 0
SHORTNESS OF BREATH: 0
BLOOD IN STOOL: 0
ABDOMINAL PAIN: 0
SORE THROAT: 0
DIARRHEA: 0
COUGH: 0
BACK PAIN: 0
EYE PAIN: 0
VOMITING: 0

## 2023-02-06 ASSESSMENT — PAIN DESCRIPTION - PAIN TYPE: TYPE: CHRONIC PAIN

## 2023-02-06 ASSESSMENT — PAIN DESCRIPTION - DESCRIPTORS: DESCRIPTORS: POUNDING

## 2023-02-06 ASSESSMENT — PAIN DESCRIPTION - ORIENTATION: ORIENTATION: LEFT

## 2023-02-06 ASSESSMENT — PAIN DESCRIPTION - ONSET: ONSET: ON-GOING

## 2023-02-06 ASSESSMENT — PAIN DESCRIPTION - LOCATION: LOCATION: KNEE

## 2023-02-06 ASSESSMENT — PAIN DESCRIPTION - FREQUENCY: FREQUENCY: CONTINUOUS

## 2023-02-06 NOTE — ED NOTES
Presenting Problem: Patient presents to [facility] on a SOB after he was brought in by mobile crisis.  According to the statement of belief: Sabino is diagnosed with [place]'s Disease which has contributed to psychosis symptoms. He is presenting as highly delusional. He believes his house is bugged and people are out to, \"get him\". He locked and barricaded himself and his grandmother in her apartment and would not let her leave or answer her telephone. His psychiatrist, Dr. Rey at [facility] has consulted with .C. staff for him to be admitted once he is assessed.    Pt presents with elevated mood. He is tangential and has rapid, pressured speech. He presents as guarded and unable to state why police were called today. Pt is somewhat suspicious and unable to answer questions regarding the events of today. He respond to most questions about today's events with, \"I'd rather not say\". He was pleasant and cooperative with staff. He denies that he is suicidal or homicidal and denied all AVH.     Writer spoke with pt's grandmother Jennifer at [phone] for collateral. Grandmother states pt has been escalating over the past few days. He has become increasingly paranoid, argumentative, and delusional. Today, he believed that someone was coming to get him and so he put chairs under the door knobs and locked all the doors. He would not let her access her phone or leave the home. He also believed someone had stolen his medications. Grandmother states pt has not been sleeping recently or caring for his personal hygiene. He has been noncompliant with his medications. She states pt is able to, \"act normal when he is around other people then when he gets by himself he acts up again\". Grandmother believes pt needs to be admitted and stabilized. She does not feel safe for him to return to her home.     Appearance/Hygiene:  hospital attire, seated in bed, fair grooming, and fair hygiene   Motor Behavior: restlessness   Attitude:  cooperative  Affect: elevated affect   Speech: pressured, rapid  Mood: euthymic   Thought Processes: tangential   Perceptions: Absent   Thought content: suspicious    Orientation: A&Ox4   Memory: fair  Concentration: Poor    Insight/ judgement: impaired insight and judgment      Psychosocial and contextual factors: Pt has his own apartment but stays with his grandmother the majority of the time at her place. Pt is currently unemployed and on SSDI for his dx of [place]'s Disease. He describes himself as his grandmother's, \"full time care taker,\" however, also states grandmother has home health care. C-SSRS flowsheet is complete. Psychiatric History (including current outpatient provider and past inpatient admissions): Pt is currently connected in services with . He has psychiatrist, Dr. Zuleika King, and neurologist, Dr. Chang Mac. He is also seen by  Ruth Cox and NP, Jonelle Wetzel at [facility]. Pt had his most recent visit with Dr. Fransisco Garland on 02/03/23. Pt has one previous psychiatric admission at  last year. He reports previous diagnosis of ADHD and states he has been compliant with his medications.      Access to Firearms: Denies    ASSESSMENT FOR IMMINENT FUTURE DANGER:    RISK FACTORS:    []  Age <25 or >49   [x]  Male gender   []  Depressed mood   []  Active suicidal ideation   []  Suicide plan   []  Suicide attempt   []  Access to lethal means   []  Prior suicide attempt   []  Active substance abuse    [x]  Highly impulsive behaviors   []  Not attending to self-care/ADLs    []  Recent significant loss   []  Chronic pain or medical illness   []  Social isolation   []  History of violence    []  Active psychosis   []  Cognitive impairment    []  No outpatient services in place   []  Medication noncompliance   [x]  No collateral information to support safety  [] Self- injurious/ Self-harm behavior    PROTECTIVE FACTORS:  [x] Age >25 and <55  [] Female gender   [x] Denies depression  [x] Denies suicidal ideation  [x] Does not have lethal plan   [x] Does not have access to guns  [x] Patient is verbally shelly for safety  [x] No prior suicide attempts  [x] No active substance abuse  [x] Patient has social or family support  [] No active psychosis or cognitive dysfunction  [] Physically healthy  [x] Has outpatient services in place  [] Compliant with recommended medications  [] Collateral information from. .. supports patient safety   [] Patient is future oriented with plans to. ..               [address] [place] [address]  02/06/23 2021

## 2023-02-07 PROBLEM — F90.0 ATTENTION DEFICIT HYPERACTIVITY DISORDER (ADHD), PREDOMINANTLY INATTENTIVE TYPE: Status: ACTIVE | Noted: 2023-02-07

## 2023-02-07 PROBLEM — J45.909 ASTHMA WITHOUT ACUTE EXACERBATION: Status: ACTIVE | Noted: 2023-02-07

## 2023-02-07 PROBLEM — E03.9 HYPOTHYROIDISM: Status: ACTIVE | Noted: 2023-02-07

## 2023-02-07 PROBLEM — F22 PARANOID DELUSION (HCC): Status: ACTIVE | Noted: 2023-02-07

## 2023-02-07 PROBLEM — K21.9 GASTROESOPHAGEAL REFLUX DISEASE: Status: ACTIVE | Noted: 2023-02-07

## 2023-02-07 PROBLEM — R05.3 CHRONIC COUGH: Status: ACTIVE | Noted: 2023-02-07

## 2023-02-07 PROBLEM — Z00.00 ENCOUNTER FOR ROUTINE ADULT MEDICAL EXAMINATION: Status: ACTIVE | Noted: 2023-02-07

## 2023-02-07 LAB
CHOLESTEROL, TOTAL: 125 MG/DL (ref 0–199)
HDLC SERPL-MCNC: 45 MG/DL (ref 40–60)
LDL CHOLESTEROL CALCULATED: 45 MG/DL
TRIGL SERPL-MCNC: 177 MG/DL (ref 0–150)
VLDLC SERPL CALC-MCNC: 35 MG/DL

## 2023-02-07 PROCEDURE — 6370000000 HC RX 637 (ALT 250 FOR IP)

## 2023-02-07 PROCEDURE — 99222 1ST HOSP IP/OBS MODERATE 55: CPT

## 2023-02-07 PROCEDURE — 1240000000 HC EMOTIONAL WELLNESS R&B

## 2023-02-07 RX ORDER — IBUPROFEN 400 MG/1
400 TABLET ORAL 2 TIMES DAILY
COMMUNITY

## 2023-02-07 RX ORDER — DEXMETHYLPHENIDATE HYDROCHLORIDE 5 MG/1
10 TABLET ORAL 2 TIMES DAILY
Status: DISCONTINUED | OUTPATIENT
Start: 2023-02-08 | End: 2023-02-09 | Stop reason: HOSPADM

## 2023-02-07 RX ADMIN — CETIRIZINE HYDROCHLORIDE 10 MG: 10 TABLET ORAL at 08:19

## 2023-02-07 RX ADMIN — LITHIUM CARBONATE 450 MG: 450 TABLET ORAL at 08:19

## 2023-02-07 RX ADMIN — OXYBUTYNIN CHLORIDE 2.5 MG: 5 TABLET ORAL at 08:19

## 2023-02-07 RX ADMIN — METHYLPHENIDATE HYDROCHLORIDE 20 MG: 10 TABLET ORAL at 08:19

## 2023-02-07 RX ADMIN — ACETAMINOPHEN 650 MG: 325 TABLET ORAL at 08:19

## 2023-02-07 RX ADMIN — ACETAMINOPHEN 650 MG: 325 TABLET ORAL at 22:35

## 2023-02-07 RX ADMIN — PANTOPRAZOLE SODIUM 40 MG: 40 TABLET, DELAYED RELEASE ORAL at 06:44

## 2023-02-07 RX ADMIN — TRAZODONE HYDROCHLORIDE 50 MG: 50 TABLET ORAL at 22:06

## 2023-02-07 RX ADMIN — OXCARBAZEPINE 300 MG: 300 TABLET, FILM COATED ORAL at 08:19

## 2023-02-07 RX ADMIN — OXCARBAZEPINE 300 MG: 300 TABLET, FILM COATED ORAL at 17:26

## 2023-02-07 RX ADMIN — LITHIUM CARBONATE 450 MG: 450 TABLET ORAL at 17:27

## 2023-02-07 RX ADMIN — CLONIDINE HYDROCHLORIDE 0.2 MG: 0.2 TABLET ORAL at 22:06

## 2023-02-07 RX ADMIN — LEVOTHYROXINE SODIUM 75 MCG: 25 TABLET ORAL at 06:44

## 2023-02-07 RX ADMIN — RISPERIDONE 2 MG: 2 TABLET ORAL at 22:06

## 2023-02-07 RX ADMIN — OXYBUTYNIN CHLORIDE 2.5 MG: 5 TABLET ORAL at 22:06

## 2023-02-07 ASSESSMENT — SLEEP AND FATIGUE QUESTIONNAIRES
DO YOU USE A SLEEP AID: YES
SLEEP PATTERN: DIFFICULTY FALLING ASLEEP;NIGHTMARES/TERRORS
DO YOU USE A SLEEP AID: YES
SLEEP PATTERN: DIFFICULTY FALLING ASLEEP;INSOMNIA
DO YOU HAVE DIFFICULTY SLEEPING: YES
AVERAGE NUMBER OF SLEEP HOURS: 6
AVERAGE NUMBER OF SLEEP HOURS: 6
DO YOU HAVE DIFFICULTY SLEEPING: YES

## 2023-02-07 ASSESSMENT — PAIN SCALES - GENERAL
PAINLEVEL_OUTOF10: 0
PAINLEVEL_OUTOF10: 0

## 2023-02-07 ASSESSMENT — PATIENT HEALTH QUESTIONNAIRE - PHQ9: SUM OF ALL RESPONSES TO PHQ QUESTIONS 1-9: 0

## 2023-02-07 NOTE — ED PROVIDER NOTES
[address] ED  EMERGENCY DEPARTMENT ENCOUNTER        Pt Name: Francisco Damon  MRN: [id number]  Armstrongflisa 1995  Date of evaluation: 2/6/2023  Provider: LIANE Montoya CNP  PCP: LIANE Becerra CNP  Note Started: 7:39 PM EST 2/6/23      GUSTAVO. I have evaluated this patient. My supervising physician was available for consultation. CHIEF COMPLAINT       Chief Complaint   Patient presents with    Psychiatric Evaluation     Patient brought in via PD and mobile crisis unit due to \"showing concerning behavior towards grandma like locking her in the house\" Patient denies any SI/HI       HISTORY OF PRESENT ILLNESS: 1 or more Elements     History From: Patient  Limitations to history : None    Francisco Damon is a 33 y.o. male who presents to the emergency department after he had apparently barricaded himself in with his grandmother in their apartment. He felt as though a hacker was going to come in their home. Is noted that he has been paranoid. he has had a history of paranoia and psychosis in the past.  No symptoms of fever or chills. No neck pain or back pain. No recent thoughts of wanting hurt himself or anyone else. Apparently this event he had locked his grandmother in their apartment and barricaded the door. He essentially was refusing to let her or himself out of the apartment. During this time he reported to me that he had concerns of a hacker that was going to cause harm to them. Denies any other acute complaints at this time states always feeling well. ursing Notes were all reviewed and agreed with or any disagreements were addressed in the HPI. REVIEW OF SYSTEMS :      Review of Systems   Constitutional:  Negative for chills, diaphoresis and fever. HENT:  Negative for congestion, ear pain, rhinorrhea and sore throat. Eyes:  Negative for pain and visual disturbance. Respiratory:  Negative for cough and shortness of breath.     Cardiovascular: Negative for chest pain and leg swelling. Gastrointestinal:  Negative for abdominal pain, blood in stool, diarrhea, nausea and vomiting. Genitourinary:  Negative for difficulty urinating, dysuria, flank pain and frequency. Musculoskeletal:  Negative for back pain and neck pain. Skin:  Negative for rash and wound. Neurological:  Negative for dizziness and light-headedness. Psychiatric/Behavioral:  Positive for agitation and behavioral problems. Negative for self-injury, sleep disturbance and suicidal ideas. The patient is nervous/anxious. Positives and Pertinent negatives as per HPI. SURGICAL HISTORY     Past Surgical History:   Procedure Laterality Date    HERNIA REPAIR         CURRENTMEDICATIONS       Previous Medications    ALBUTEROL (PROVENTIL HFA;VENTOLIN HFA) 108 (90 BASE) MCG/ACT INHALER    Inhale 2 puffs into the lungs every 6 hours as needed for Wheezing. ARIPIPRAZOLE (ABILIFY) 10 MG TABLET    Take 10 mg by mouth daily    CETIRIZINE (ZYRTEC) 10 MG TABLET    Take 10 mg by mouth daily    CLONAZEPAM (KLONOPIN) 1 MG TABLET    Take 1 mg by mouth 2 times daily as needed. CLONIDINE (CATAPRES) 0.2 MG/24HR PTWK    Place 1 patch onto the skin once a week    DEXMETHYLPHENIDATE HCL (FOCALIN PO)    Take 1 tablet by mouth 2 times daily. 30mg daily and 5mg bid    DICLOFENAC (VOLTAREN) 50 MG EC TABLET    Take 1 tablet by mouth 2 times daily    FAMOTIDINE-CA CARB-MAG HYDROX -165 MG CHEW    Take 1 tablet by mouth daily for 14 days    FLUTICASONE (FLOVENT HFA) 110 MCG/ACT INHALER    Inhale 1 puff into the lungs 2 times daily    OXCARBAZEPINE (TRILEPTAL) 150 MG TABLET    Take 600 mg by mouth 2 times daily 450mg bid    OXYBUTYNIN (DITROPAN) 5 MG TABLET    Take 2.5 mg by mouth 2 times daily    TRAZODONE (DESYREL) 50 MG TABLET    Take 50 mg by mouth nightly       ALLERGIES     Other    FAMILYHISTORY     No family history on file.      SOCIAL HISTORY       Social History     Tobacco Use    Smoking status: Never    Smokeless tobacco: Never   Substance Use Topics    Alcohol use: No    Drug use: No       SCREENINGS                         CIWA Assessment  BP: 138/87  Heart Rate: 87           PHYSICAL EXAM  1 or more Elements     ED Triage Vitals [02/06/23 1811]   BP Temp Temp src Heart Rate Resp SpO2 Height Weight   138/87 98.6 °F (37 °C) -- 87 19 98 % 5' 7\" (1.702 m) 138 lb (62.6 kg)       Physical Exam  Vitals and nursing note reviewed. Constitutional:       Appearance: Normal appearance. He is not toxic-appearing or diaphoretic. HENT:      Head: Normocephalic and atraumatic. Nose: Nose normal.   Eyes:      General:         Right eye: No discharge. Left eye: No discharge. Cardiovascular:      Rate and Rhythm: Normal rate and regular rhythm. Pulses: Normal pulses. Heart sounds: No murmur heard. Pulmonary:      Effort: Pulmonary effort is normal. No respiratory distress. Breath sounds: No wheezing or rhonchi. Abdominal:      Palpations: Abdomen is soft. Tenderness: There is no abdominal tenderness. There is no guarding or rebound. Musculoskeletal:         General: Normal range of motion. Cervical back: Normal range of motion and neck supple. Right lower leg: No edema. Left lower leg: No edema. Skin:     General: Skin is warm and dry. Capillary Refill: Capillary refill takes less than 2 seconds. Neurological:      General: No focal deficit present. Mental Status: He is alert and oriented to person, place, and time. GCS: GCS eye subscore is 4. GCS verbal subscore is 5. GCS motor subscore is 6. Gait: Gait normal.   Psychiatric:         Attention and Perception: Attention normal. He is attentive. Mood and Affect: Mood is anxious. Speech: Speech is not delayed or slurred. Behavior: Behavior normal. Behavior is cooperative. Thought Content:  Thought content is paranoid and delusional. Thought content does not include homicidal or suicidal ideation. Thought content does not include homicidal or suicidal plan. DIAGNOSTIC RESULTS   LABS:    Labs Reviewed   CBC WITH AUTO DIFFERENTIAL - Abnormal; Notable for the following components:       Result Value    RBC 4.09 (*)     Hemoglobin 12.2 (*)     Hematocrit 35.8 (*)     All other components within normal limits   COMPREHENSIVE METABOLIC PANEL W/ REFLEX TO MG FOR LOW K - Abnormal; Notable for the following components:    Potassium reflex Magnesium 3.4 (*)     Glucose 111 (*)     Total Protein 6.3 (*)     Albumin/Globulin Ratio 2.7 (*)     AST 14 (*)     All other components within normal limits   SALICYLATE LEVEL - Abnormal; Notable for the following components:    Salicylate, Serum <2.3 (*)     All other components within normal limits   ACETAMINOPHEN LEVEL - Abnormal; Notable for the following components:    Acetaminophen Level <5 (*)     All other components within normal limits   COVID-19 & INFLUENZA COMBO   ETHANOL   URINE DRUG SCREEN   MAGNESIUM       When ordered only abnormal lab results are displayed. All other labs were within normal range or not returned as of this dictation. EKG: When ordered, EKG's are interpreted by the Emergency Department Physician in the absence of a cardiologist.  Please see their note for interpretation of EKG. RADIOLOGY:   Non-plain film images such as CT, Ultrasound and MRI are read by the radiologist. Plain radiographic images are visualized and preliminarily interpreted by the ED Provider with the below findings:        Interpretation per the Radiologist below, if available at the time of this note:    No orders to display     No results found. No results found. PROCEDURES   Unless otherwise noted below, none     Procedures    CRITICAL CARE TIME (.cctime)       PAST MEDICAL HISTORY      has a past medical history of ADHD (attention deficit hyperactivity disorder), Asthma, and [place]'s disease ([facility] [address] EMERGENCY DEPARTMENT COURSE and DIFFERENTIAL DIAGNOSIS/MDM:   Vitals:    Vitals:    02/06/23 1811   BP: 138/87   Pulse: 87   Resp: 19   Temp: 98.6 °F (37 °C)   SpO2: 98%   Weight: 138 lb (62.6 kg)   Height: 5' 7\" (1.702 m)       Patient was given the following medications:  Medications   potassium chloride (KLOR-CON M) extended release tablet 20 mEq (20 mEq Oral Given 2/6/23 1954)             Is this patient to be included in the SEP-1 Core Measure due to severe sepsis or septic shock? No   Exclusion criteria - the patient is NOT to be included for SEP-1 Core Measure due to:  2+ SIRS criteria are not met    Chronic Conditions affecting care:    has a past medical history of ADHD (attention deficit hyperactivity disorder), Asthma, and [place]'s disease ([facility] [address] CONSULTS: (Who and What was discussed)  None      Social Determinants Significantly Affecting Health : None    Records Reviewed (External and Source)     CC/HPI Summary, DDx, ED Course, and Reassessment: Patient here in the emergency department with symptoms of paranoia. Apparently he had locked his grandmother against her well in a apartment and barricaded the door with him with her. Patient had paranoia that someone would be entering the home to harm them. Mobile crisis unit was notified EMS police were notified was able to resolve the situation. He was placed on a psychiatric hold by our mobile crisis center. Since then patient has been awake and alert and oriented. He has been cooperative. Patient denies any thoughts when to hurt himself or anyone else. Patient states that he thought a hacker was going to cause them harm. No recent drug or alcohol use. Patient medically cleared. I have performed a medical clearance examination on this patient. It is my opinion that no medical conditions were discovered that would preclude admission to a behavioral health unit or discharge home.   I feel that the patient is medically stable for disposition by the behavioral health team at this time. Disposition Considerations (tests considered but not done, Admit vs D/C, Shared Decision Making, Pt Expectation of Test or Tx.):       I am the Primary Clinician of Record. FINAL IMPRESSION      1. Paranoid delusion ([facility] [address]          DISPOSITION/PLAN     DISPOSITION        PATIENT REFERRED TO:  No follow-up provider specified.     DISCHARGE MEDICATIONS:  New Prescriptions    No medications on file       DISCONTINUED MEDICATIONS:  Discontinued Medications    No medications on file              (Please note that portions of this note were completed with a voice recognition program.  Efforts were made to edit the dictations but occasionally words are mis-transcribed.)    LIANE De Leon CNP (electronically signed)       LIANE De Leon CNP  02/06/23 1957

## 2023-02-07 NOTE — PROGRESS NOTES
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[address]     PTSD (post-traumatic stress disorder)        Status EXAM:  Mental Status and Behavioral Exam  Normal: No  Level of Assistance: Independent/Self  Facial Expression: Brightened, Flat  Affect: Incongruent  Level of Consciousness: Alert  Frequency of Checks: 4 times per hour, close  Mood:Normal: No  Mood: Anxious  Motor Activity:Normal: Yes  Eye Contact: Good  Observed Behavior: Cooperative, Friendly  Sexual Misconduct History: Current - no  Preception: [place] to person, [place] to time, [place] to place, [place] to situation  Attention:Normal: No  Attention: Distractible, Unable to concentrate  Thought Processes: Flight of ideas  Thought Content:Normal: No  Thought Content: Paranoia (It was reported that patient said people are out to get him & his grandmother;s home was bugged, Patient stated that he he didn't say that.)  Depression Symptoms: No problems reported or observed. Anxiety Symptoms: Generalized  Patti Symptoms: No problems reported or observed.   Hallucinations: None (Patient denied)  Delusions: Yes  Delusions: Paranoid (This was per report & patient denies.)  Memory:Normal: No  Memory: Poor recent, Poor remote (\"I think it's related to my hunnington's\")  Insight and Judgment: No  Insight and Judgment: Poor judgment, Poor insight    Tobacco Screening:  Practical Counseling, on admission, brooke X, if applicable and completed (first 3 are required if patient doesn't refuse)            ( ) Recognizing danger situations (included triggers and roadblocks)                    ( ) Coping skills (new ways to manage stress,relaxation techniques, changing routine, distraction)                                                           ( ) Basic information about quitting (benefits of quitting, techniques in how to quit, available resources  ( ) Referral for counseling faxed to [facility]                                                                                                                   ( ) Patient refused counseling  ( X) Patient has not smoked in the last 30 days    Metabolic Screening:    No results found for: LABA1C    No results found for: CHOL  No results found for: TRIG  No results found for: HDL  No components found for: LDLCAL  No results found for: LABVLDL      Body mass index is 20.8 kg/m².     BP Readings from Last 2 Encounters:   02/06/23 107/72   06/07/21 121/73           Pt admitted with followings belongings:  Dental Appliances: None  Vision - Corrective Lenses: Eyeglasses  Hearing Aid: None  Jewelry: Necklace  Body Piercings Removed: N/A  Clothing: Undergarments, Socks, Footwear, Pants, Shirt, Jacket/Coat  Other Valuables: Freeman Bedolla RN

## 2023-02-07 NOTE — ED NOTES
Patient currently pacing in [facility] area, pleasant and cooperative. Awaiting admission to [facility] unit. Will continue to monitor.      Diego Pearson  02/06/23 9818

## 2023-02-07 NOTE — H&P
Hospital Medicine History & Physical      PCP: Laura Gay, LIANE - CNP    Date of Admission: 2/6/2023    Date of Service: Pt seen/examined on 2/7/2023      Chief Complaint:    Chief Complaint   Patient presents with    Psychiatric Evaluation     Patient brought in via PD and mobile crisis unit due to \"showing concerning behavior towards grandma like locking her in the house\" Patient denies any SI/HI         History Of Present Illness: The patient is a 33 y.o. male with pmhx below who presented to [facility] for psychosis. Patient was seen and evaluated in the ED by the ED medical provider, patient was medically cleared for admission to [facility] at [facility]. This note serves as an admission medical H&P. Tobacco use: denies  ETOH use: denies  Illicit drug use: denies    Patient states he has had non-productive cough x 3 months. Otherwise denies medical complaints. 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 [address]     PTSD (post-traumatic stress disorder)        Past Surgical History:        Procedure Laterality Date    HERNIA REPAIR         Medications Prior to Admission:    Prior to Admission medications    Medication Sig Start Date End Date Taking?  Authorizing Provider   ibuprofen (ADVIL;MOTRIN) 400 MG tablet Take 400 mg by mouth 2 times daily   Yes Historical Provider, MD   omeprazole (PRILOSEC) 20 MG delayed release capsule Take 20 mg by mouth every morning (before breakfast)   Yes Historical Provider, MD   cloNIDine (CATAPRES) 0.2 MG tablet Take 0.2 mg by mouth daily 2/2/23   Historical Provider, MD   levothyroxine (SYNTHROID) 75 MCG tablet Take 75 mcg by mouth daily 2/2/23   Historical Provider, MD   lithium (ESKALITH) 450 MG extended release tablet Take 450 mg by mouth 2 times daily Patient reported that he  takes the lithium every morning & at 6 pm 2/2/23   Historical Provider, MD   risperiDONE (RISPERDAL) 2 MG tablet Take 2 mg by mouth daily 2/2/23   Historical Provider, MD   OXcarbazepine (TRILEPTAL) 300 MG tablet Take 300 mg by mouth 2 times daily Patient reported taking med every morning & at 6 pm 2/2/23   Historical Provider, MD   cetirizine (ZYRTEC) 10 MG tablet Take 10 mg by mouth daily    Historical Provider, MD   traZODone (DESYREL) 50 MG tablet Take 25 mg by mouth nightly    Historical Provider, MD   oxybutynin (DITROPAN) 5 MG tablet Take 2.5 mg by mouth 2 times daily    Historical Provider, MD   Dexmethylphenidate HCl (FOCALIN PO) Take 20 mg by mouth daily    Historical Provider, MD   OXcarbazepine (TRILEPTAL) 150 MG tablet Take 600 mg by mouth 2 times daily 450mg bid  Patient not taking: Reported on 2/6/2023    Historical Provider, MD       Allergies: Other    Social History:  The patient currently lives at home    TOBACCO:   reports that he has never smoked. He has never used smokeless tobacco.  ETOH:   reports no history of alcohol use. Family History:   Positive as follows:        Problem Relation Age of Onset    Huntingtons Disease Mother     Heart Disease Father     Stroke Father     Stroke Maternal Grandmother     High Cholesterol Maternal Grandmother     Diabetes Maternal Grandmother        REVIEW OF SYSTEMS:       Constitutional: Negative for fever   HENT: Negative for sore throat   Eyes: Negative for redness   Respiratory: Negative  for dyspnea, cough   Cardiovascular: Negative for chest pain   Gastrointestinal: Negative for vomiting, diarrhea   Genitourinary: Negative for hematuria   Musculoskeletal: Negative for arthralgias   Skin: Negative for rash   Neurological: Negative for syncope    Hematological: Negative for easy bruising/bleeding   Psychiatric/Behavorial: Per psychiatry team evaluation     PHYSICAL EXAM:    /72   Pulse 90   Temp 97.7 °F (36.5 °C) (Oral)   Resp 16   Ht 5' 7\" (1.702 m)   Wt 132 lb 12.8 oz (60.2 kg)   SpO2 100%   BMI 20.80 kg/m²     Gen: No distress. Alert. Eyes: PERRL. No sclera icterus. No conjunctival injection. ENT: No discharge. Pharynx clear. Neck: No JVD. Trachea midline. Resp: No accessory muscle use. No crackles. No wheezes. No rhonchi. CV: Regular rate. Regular rhythm. No murmur. No rub. No edema. GI: Non-tender. Non-distended. Normal bowel sounds. Skin: Warm and dry. No nodule on exposed extremities. No rash on exposed extremities. M/S: No cyanosis. No joint deformity. No clubbing. Neuro: Awake. No focal neurologic deficit on exam.  Cranial nerves II through XII intact. Patient is able to ambulate without difficulty.   Psych: Per psychiatry team evaluation     CBC:   Recent Labs     02/06/23 1836   WBC 6.0   HGB 12.2*   HCT 35.8*   MCV 87.7        BMP:   Recent Labs     02/06/23 1836      K 3.4*      CO2 27   BUN 9   CREATININE 0.9     LIVER PROFILE:   Recent Labs     02/06/23 1836   AST 14*   ALT 17   BILITOT <0.2   ALKPHOS 112     UA:  Recent Labs     02/06/23 1836   PHUR 7.0         Latest Reference Range & Units 2/6/23 18:36   Amphetamine Screen, Urine Negative <1000ng/mL  Neg   Benzodiazepine Screen, Urine Negative <200 ng/mL  Neg   Cocaine Metabolite Screen, Urine Negative <300 ng/mL  Neg   FENTANYL SCREEN, URINE Negative <5 ng/mL  Neg   METHADONE SCREEN, URINE, 73022 Negative <300 ng/mL  Neg   Opiate Scrn, Ur Negative <300 ng/mL  Neg   Oxycodone Urine Negative <100 ng/ml  Neg   PCP Screen, Urine Negative <25 ng/mL  Neg   Cannabinoid Scrn, Ur Negative <50 ng/mL  Neg     CULTURES   Latest Reference Range & Units 2/6/23 18:36   INFLUENZA A NOT DETECTED  NOT DETECTED   INFLUENZA B NOT DETECTED  NOT DETECTED   SARS-CoV-2 RNA, RT PCR NOT DETECTED  NOT DETECTED       EKG:  I have reviewed the EKG with the following interpretation:   NA    RADIOLOGY  No orders to display        ASSESSMENT/PLAN:  #Psychosis  #ADHD  #PTSD  - per psychiatry team    #Cough  -non-productive x 3 months  -does not want cough suppressant    #Cheshire's disease  -follow with [facility]   -no chorea noted  -on Trileptal and Risperdal     #Asthma no ae  -continue albuterol PRN    #Hypothyroidism  -continue synthroid    #GERD  -continue PPI    Merrick Rubio PA-C  2/7/2023 9:14 AM

## 2023-02-07 NOTE — ED NOTES
Blood, covid and urine samples obtained and sent to lab by [facility].       Marcus Kovacs RN  02/06/23 6694
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
## 2023-02-07 NOTE — PROGRESS NOTES
Patient arrived on the unit at 21:50, via wheelchair, accompanied by Laurel Skinner from this hospital's emergency room [facility]). Patient was pleasant & cooperative & greeted by Theron Andujar.  Araceli Greene R.N.

## 2023-02-07 NOTE — PROGRESS NOTES
Behavioral Services  Medicare Certification Upon Admission    I certify that this patient's inpatient psychiatric hospital admission is medically necessary for:    [x] (1) Treatment which could reasonably be expected to improve this patient's condition,       [x] (2) Or for diagnostic study;     AND     [x](2) The inpatient psychiatric services are provided while the individual is under the care of a physician and are included in the individualized plan of care.     Estimated length of stay/service 5 d    Plan for post-hospital care outpt    Electronically signed by Ines Rivers MD on 2/7/2023 at 10:18 AM

## 2023-02-07 NOTE — GROUP NOTE
Group Therapy Note    Date: 2/7/2023    Group Start Time: 1300  Group End Time: 7655  Group Topic: Psychoeducation    [address] [place] [address]        Group Therapy Note    Attendees: 23    Facilitator led a group discussion on positive affirmations and the power of encouraging mantras. Patients were given index cards and markers, then instructed to write an affirmation, mantra, or inspirational quote on the card. Patients then played [employer] with the index cards. Patients took turns decided if they wanted to keep their index card or take from another person. Patients played the game until everyone had a turn and had shared their affirmation card with the group. Patient's Goal:  Patients will understand the significance of positive affirmations and be able to create an index card with an inspirational or encouraging quote. Notes:  Patients contributed to activity by writing an index card with a positive affirmation/mantra to share in [employer]. Patient identified the importance of positive affirmations in improving self-esteem and increasing positive thoughts. Status After Intervention:  Improved    Participation Level:  Active Listener and Interactive    Participation Quality: Appropriate and Attentive      Speech:  normal      Thought Process/Content: Logical  Linear      Affective Functioning: Congruent      Mood: euthymic      Level of consciousness:  Alert, Oriented x4, and Attentive      Response to Learning: Able to verbalize current knowledge/experience, Able to verbalize/acknowledge new learning, Able to change behavior, and Progressing to goal      Endings: None Reported    Modes of Intervention: Support, Exploration, and Activity      Discipline Responsible: /Counselor      Signature:  ROMAN Sal

## 2023-02-07 NOTE — PROGRESS NOTES
Pharmacologic effects of lithium may be increased by NSAIDs. Elevated lithium serum concentrations and toxicity may occur.  Do we want to do scheduled ibuprofen for this patient? Provider - please reach out to pharmacy and let us know what you want to do. Thanks!     Rosalia JeanD, [facility], 2/7/2023 3:08 AM

## 2023-02-07 NOTE — ED NOTES
Level of Care Disposition: Admit/Attempt to transfer to  for continuity of care      Patient was seen by ED provider and [facility] AN AFFILIATE OF [facility] staff. The case presented to psychiatric provider on-call TRACY Contreras. Based on the ED evaluation and information presented to the provider by this writer, it is the recommendation of the on call psychiatric provider that inpatient hospitalization is the least restrictive environment for the patient at this time. The patient will be admitted to the inpatient unit.       [address] [place] [address]  02/06/23 2004
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
## 2023-02-07 NOTE — ED NOTES
Reviewed updated clinical with BENJI Baptiste-BC. Decision to admit to [facility].      [address] [place] [address]  02/06/23 2035

## 2023-02-07 NOTE — ED NOTES
Neeta Jennings,  from , called and unable to get him admitted into  due to being at capacity. She states that Dr Christiano Camargo would like him to be admitted here and he will coordinate with Psychiatry here for medication management.       Arley Albrecht RN  02/06/23 2030

## 2023-02-07 NOTE — ED NOTES
Taken via wheelchair to [facility] with 2 staff members and all personal belongings.       Deirdre Bruno RN  02/06/23 1579
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
## 2023-02-07 NOTE — ED NOTES
Patient brought back from triage. Patient changed out into safety clothing. Patient belongings locked into locker by Kaylen. Patient oriented to [facility] AN AFFILIATE OF [facility]. Will continue to monitor patient.       Leigh Ann Cee RN  02/06/23 3717

## 2023-02-08 PROCEDURE — 6370000000 HC RX 637 (ALT 250 FOR IP)

## 2023-02-08 PROCEDURE — 1240000000 HC EMOTIONAL WELLNESS R&B

## 2023-02-08 RX ADMIN — LITHIUM CARBONATE 450 MG: 450 TABLET ORAL at 17:58

## 2023-02-08 RX ADMIN — PANTOPRAZOLE SODIUM 40 MG: 40 TABLET, DELAYED RELEASE ORAL at 07:10

## 2023-02-08 RX ADMIN — CETIRIZINE HYDROCHLORIDE 10 MG: 10 TABLET ORAL at 08:39

## 2023-02-08 RX ADMIN — RISPERIDONE 2 MG: 2 TABLET ORAL at 22:30

## 2023-02-08 RX ADMIN — OXYBUTYNIN CHLORIDE 2.5 MG: 5 TABLET ORAL at 22:30

## 2023-02-08 RX ADMIN — OXCARBAZEPINE 300 MG: 300 TABLET, FILM COATED ORAL at 17:58

## 2023-02-08 RX ADMIN — OXYBUTYNIN CHLORIDE 2.5 MG: 5 TABLET ORAL at 08:38

## 2023-02-08 RX ADMIN — LITHIUM CARBONATE 450 MG: 450 TABLET ORAL at 08:38

## 2023-02-08 RX ADMIN — ACETAMINOPHEN 650 MG: 325 TABLET ORAL at 23:51

## 2023-02-08 RX ADMIN — ACETAMINOPHEN 650 MG: 325 TABLET ORAL at 18:36

## 2023-02-08 RX ADMIN — TRAZODONE HYDROCHLORIDE 50 MG: 50 TABLET ORAL at 23:51

## 2023-02-08 RX ADMIN — CLONIDINE HYDROCHLORIDE 0.2 MG: 0.2 TABLET ORAL at 22:29

## 2023-02-08 RX ADMIN — ACETAMINOPHEN 650 MG: 325 TABLET ORAL at 08:41

## 2023-02-08 RX ADMIN — TRAZODONE HYDROCHLORIDE 25 MG: 50 TABLET ORAL at 22:31

## 2023-02-08 RX ADMIN — LEVOTHYROXINE SODIUM 75 MCG: 25 TABLET ORAL at 07:10

## 2023-02-08 RX ADMIN — OXCARBAZEPINE 300 MG: 300 TABLET, FILM COATED ORAL at 08:39

## 2023-02-08 ASSESSMENT — PAIN DESCRIPTION - LOCATION
LOCATION: KNEE
LOCATION: KNEE
LOCATION: FOOT
LOCATION: KNEE

## 2023-02-08 ASSESSMENT — PAIN DESCRIPTION - DESCRIPTORS
DESCRIPTORS: ACHING

## 2023-02-08 ASSESSMENT — PAIN DESCRIPTION - FREQUENCY
FREQUENCY: CONTINUOUS
FREQUENCY: CONTINUOUS

## 2023-02-08 ASSESSMENT — PAIN SCALES - GENERAL
PAINLEVEL_OUTOF10: 6
PAINLEVEL_OUTOF10: 3

## 2023-02-08 ASSESSMENT — PAIN DESCRIPTION - ONSET
ONSET: ON-GOING
ONSET: ON-GOING

## 2023-02-08 ASSESSMENT — PAIN DESCRIPTION - ORIENTATION
ORIENTATION: RIGHT
ORIENTATION: RIGHT;LEFT

## 2023-02-08 ASSESSMENT — PAIN DESCRIPTION - PAIN TYPE
TYPE: CHRONIC PAIN
TYPE: CHRONIC PAIN

## 2023-02-08 ASSESSMENT — PAIN - FUNCTIONAL ASSESSMENT
PAIN_FUNCTIONAL_ASSESSMENT: ACTIVITIES ARE NOT PREVENTED

## 2023-02-08 NOTE — GROUP NOTE
Group Therapy Note    Date: 2/8/2023    Group Start Time: 1000  Group End Time: 1050  Group Topic: Psychoeducation    [address]        Group Therapy Note    Attendees: 13       Patient's Goal: to learn and identify the feelings that we have underneath anger and identify \"what am I really feeling\" and \"what is driving my anger. \" Pt's were asked to practice this and apply to their lives. Notes:  pt attended group for the full duration. Pt participated in group discussion and was able to apply to himself. Status After Intervention:  Improved    Participation Level:  Active Listener and Interactive    Participation Quality: Appropriate, Attentive, and Sharing      Speech:  normal      Thought Process/Content: Logical      Affective Functioning: Congruent      Mood: euthymic      Level of consciousness:  Alert, Oriented x4, and Attentive      Response to Learning: Able to verbalize current knowledge/experience, Able to verbalize/acknowledge new learning, and Progressing to goal      Endings: None Reported    Modes of Intervention: Education, Support, Socialization, Exploration, and Clarifying      Discipline Responsible: /Counselor      Signature:  ESTRELLA Lynn

## 2023-02-08 NOTE — GROUP NOTE
Group Therapy Note    Date: 2/8/2023    Group Start Time: 1100  Group End Time: 0021  Group Topic: Psychoeducation    [facility]    GABRIELLA Brewer        Group Therapy Note  Clinician engaged the group members with a discussion of unhealthy versus healthy boundaries. The clinician and volunteer from the group did role plays for each. Clinician taught the group to use clearly stated boundary when practice setting them. They noticed/listed the tone of voice, volume, hand movements, facial expressions etc during the role plays. Attendees: 16       Patient's Goal:      Notes:  Patient was cooperative and fully engaged in the group discussion and activity. Patient participated in the skills practice and verbalized verbal and non-verbals cues during the role play. Status After Intervention:  Improved    Participation Level:  Active Listener and Interactive    Participation Quality: Appropriate, Attentive, and Sharing      Speech:  pressured      Thought Process/Content: Linear      Affective Functioning: Congruent      Mood: anxious      Level of consciousness:  Attentive      Response to Learning: Able to retain information      Endings: None Reported    Modes of Intervention: Education, Support, and Activity      Discipline Responsible: /Counselor      Signature:  GABRIELLA Brewer

## 2023-02-08 NOTE — PLAN OF CARE
Problem: Pain  Goal: Verbalizes/displays adequate comfort level or baseline comfort level  Outcome: Progressing     Problem: ABCDS Injury Assessment  Goal: Absence of physical injury  Outcome: Progressing     Problem: Psychosis  Goal: Will report no hallucinations or delusions  Description: INTERVENTIONS:  1. Administer medication as  ordered  2. Assist with reality testing to support increasing orientation  3. Assess if patient's hallucinations or delusions are encouraging self harm or harm to others and intervene as appropriate  Outcome: Progressing     Problem: Anxiety  Goal: Will report anxiety at manageable levels  Description: INTERVENTIONS:  1. Administer medication as ordered  2. Teach and rehearse alternative coping skills  3. Provide emotional support with 1:1 interaction with staff  Outcome: Progressing     Patient was visible on unit, social with peers. Medication complaint. Attended and participated in groups. Patient was cooperative with interview. Denies SI/HI/AVH. Continues to be intrusive and hyper verbal at times but polite and easily redirected. He is disoriented to situation, feels that he does not need to be here, stated he was unsure why his grandmother thought he should come. No paranoid thoughts or statements made this shift. Some increased pain d/t arthritis in right knee. Tylenol given which was effective. He has been cooperative with all care and able to verbalize his needs to staff.

## 2023-02-08 NOTE — PROGRESS NOTES
Department of Psychiatry  AttendingProgress Note  Chief Complaint: mood lability  Mayra Loya is involved with groups and is not showing any evidence of psychosis nor aggression. He is cooperative, although his insight is impaired. He plans to spend time with GM even though she mentioned his level of paranoia and concerns about safety at home . He has difficulty expressing self as his speech is rapid and runs words together. Apparently his psychiatrist would like him to be on [place] v [place]. Patient's chart was reviewed and collaborated with  about the treatment plan. SUBJECTIVE:    Patient is feeling better. Suicidal ideation:  denies suicidal ideation. Patient does not have medication side effects. ROS: Patient has new complaints: no  Sleeping adequately:  Yes   Appetite adequate: Yes  Attending groups: Yes  Visitors:No    OBJECTIVE    Physical  VITALS:  /83   Pulse 76   Temp 97.6 °F (36.4 °C) (Oral)   Resp 16   Ht 5' 7\" (1.702 m)   Wt 132 lb 12.8 oz (60.2 kg)   SpO2 99%   BMI 20.80 kg/m²     Mental Status Examination:  Patients appearance was street clothes. Thoughts are Goal directed. Homicidal ideations none. No abnormal movements, tics or mannerisms. Memory intact Aims 0. Concentration Fair. Alert and oriented X 4. Insight and Judgement impaired insight. Patient was cooperative.  Patient gait normal. Mood anxious, affect anxiety Hallucinations Absent, suicidal ideations no specific plan to harm self Speech pressured  Data  Labs:   Admission on 02/06/2023   Component Date Value Ref Range Status    WBC 02/06/2023 6.0  4.0 - 11.0 K/uL Final    RBC 02/06/2023 4.09 (A)  4.20 - 5.90 M/uL Final    Hemoglobin 02/06/2023 12.2 (A)  13.5 - 17.5 g/dL Final    Hematocrit 02/06/2023 35.8 (A)  40.5 - 52.5 % Final    MCV 02/06/2023 87.7  80.0 - 100.0 fL Final    MCH 02/06/2023 29.9  26.0 - 34.0 pg Final    MCHC 02/06/2023 34.1  31.0 - 36.0 g/dL Final    RDW 02/06/2023 12.7 12.4 - 15.4 % Final    Platelets 08/97/2349 261  135 - 450 K/uL Final    MPV 02/06/2023 8.4  5.0 - 10.5 fL Final    Neutrophils % 02/06/2023 65.7  % Final    Lymphocytes % 02/06/2023 26.3  % Final    Monocytes % 02/06/2023 5.2  % Final    Eosinophils % 02/06/2023 2.3  % Final    Basophils % 02/06/2023 0.5  % Final    Neutrophils Absolute 02/06/2023 3.9  1.7 - 7.7 K/uL Final    Lymphocytes Absolute 02/06/2023 1.6  1.0 - 5.1 K/uL Final    Monocytes Absolute 02/06/2023 0.3  0.0 - 1.3 K/uL Final    Eosinophils Absolute 02/06/2023 0.1  0.0 - 0.6 K/uL Final    Basophils Absolute 02/06/2023 0.0  0.0 - 0.2 K/uL Final    Sodium 02/06/2023 137  136 - 145 mmol/L Final    Potassium reflex Magnesium 02/06/2023 3.4 (A)  3.5 - 5.1 mmol/L Final    Chloride 02/06/2023 103  99 - 110 mmol/L Final    CO2 02/06/2023 27  21 - 32 mmol/L Final    Anion Gap 02/06/2023 7  3 - 16 Final    Glucose 02/06/2023 111 (A)  70 - 99 mg/dL Final    BUN 02/06/2023 9  7 - 20 mg/dL Final    Creatinine 02/06/2023 0.9  0.9 - 1.3 mg/dL Final    Est, Glom Filt Rate 02/06/2023 >60  >60 Final    Comment: Pediatric calculator link  [facility].. org/professionals/kdoqi/gfr_calculatorped  Effective Oct 3, 2022  These results are not intended for use in patients  <25years of age. eGFR results are calculated without  a race factor using the 2021 CKD-EPI equation. Careful  clinical correlation is recommended, particularly when  comparing to results calculated using previous equations. The CKD-EPI equation is less accurate in patients with  extremes of muscle mass, extra-renal metabolism of  creatinine, excessive creatinine ingestion, or following  therapy that affects renal tubular secretion.       Calcium 02/06/2023 8.9  8.3 - 10.6 mg/dL Final    Total Protein 02/06/2023 6.3 (A)  6.4 - 8.2 g/dL Final    Albumin 02/06/2023 4.6  3.4 - 5.0 g/dL Final    Albumin/Globulin Ratio 02/06/2023 2.7 (A)  1.1 - 2.2 Final    Total Bilirubin 02/06/2023 <0.2  0.0 - 1.0 mg/dL Final    Alkaline Phosphatase 02/06/2023 112  40 - 129 U/L Final    ALT 02/06/2023 17  10 - 40 U/L Final    AST 02/06/2023 14 (A)  15 - 37 U/L Final    Salicylate, Serum 85/70/5253 <0.3 (A)  15.0 - 30.0 mg/dL Final    Comment: Therapeutic Range: 15.0-30.0 mg/dL  Toxic: >30.0 mg/dL      Acetaminophen Level 02/06/2023 <5 (A)  10 - 30 ug/mL Final    Comment: Therapeutic Range: 10.0-30.0 ug/mL  Toxic: >=150 ug/mL      Ethanol Lvl 02/06/2023 None Detected  mg/dL Final    Comment:    None Detected  Conversion factor:  100 mg/dl = .100 g/dl  For Medical Purposes Only      SARS-CoV-2 RNA, RT PCR 02/06/2023 NOT DETECTED  NOT DETECTED Final    Comment: Not Detected results do not preclude SARS-CoV-2 infection and  should not be used as the sole basis for patient management  decisions. Results must be combined with clinical observations,  patient history, and epidemiological information. Testing was performed using [employer] TIM SARS-CoV-2 and Influenza A/B  nucleic acid assay. This test is a multiplex Real-Time Reverse  Transcriptase Polymerase Chain Reaction (RT-PCR)-based in vitro  diagnostic test intended for the qualitative detection of nucleic  acids from SARS-CoV-2, influenza A, and influenza B in nasopharyngeal  and nasal swab specimens for use under the FDAs Emergency Use  Authorization (EUA) only.     Patient Fact Sheet:  FindDrives.pl  Provider Fact Sheet: FindDrives.pl  EUA: FindDrives.pl  IFU: FindDrives.pl    Methodology:  RT-PCR      INFLUENZA A 02/06/2023 NOT DETECTED  NOT DETECTED Final    INFLUENZA B 02/06/2023 NOT DETECTED  NOT DETECTED Final    Amphetamine Screen, Urine 02/06/2023 Neg  Negative <1000ng/mL Final    Barbiturate Screen, Ur 02/06/2023 Neg  Negative <200 ng/mL Final    Benzodiazepine Screen, Urine 02/06/2023 Neg  Negative <200 ng/mL Final    Cannabinoid Scrn, Ur 02/06/2023 Neg Negative <50 ng/mL Final    Cocaine Metabolite Screen, Urine 02/06/2023 Neg  Negative <300 ng/mL Final    Opiate Scrn, Ur 02/06/2023 Neg  Negative <300 ng/mL Final    Comment: \"Therapeutic levels of pain medication, especially oxycontin and synthetic  opioids, may not be detected by this Methodology. Pain management screen  panel  Drug panel-PM-Hi Res Ur, Interp (PAIN) should be considered for drug  monitoring \". PCP Screen, Urine 02/06/2023 Neg  Negative <25 ng/mL Final    Methadone Screen, Urine 02/06/2023 Neg  Negative <300 ng/mL Final    Oxycodone Urine 02/06/2023 Neg  Negative <100 ng/ml Final    FENTANYL SCREEN, URINE 02/06/2023 Neg  Negative <5 ng/mL Final    pH, UA 02/06/2023 7.0   Final    Comment: Urine pH less than 5.0 or greater than 8.0 may indicate sample adulteration. Another sample should be collected if clinically  indicated. Drug Screen Comment: 02/06/2023 see below   Final    Comment: This method is a screening test to detect only these drug  classes as part of a medical workup. Confirmatory testing  by another method should be ordered if clinically indicated. Magnesium 02/06/2023 2.10  1.80 - 2.40 mg/dL Final    Cholesterol, Total 02/06/2023 125  0 - 199 mg/dL Final    Triglycerides 02/06/2023 177 (A)  0 - 150 mg/dL Final    HDL 02/06/2023 45  40 - 60 mg/dL Final    Comment: An HDL cholesterol less than 40 mg/dL is low and  constitutes a coronary heart disease risk factor. An HDL cholesterol greater than 60 mg/dL is a  negative risk factor for coronary heart disease.       LDL Calculated 02/06/2023 45  <100 mg/dL Final    VLDL Cholesterol Calculated 02/06/2023 35  Not Established mg/dL Final    TSH 02/06/2023 0.91  0.27 - 4.20 uIU/mL Final    [place] Lvl 02/06/2023 0.8  0.6 - 1.2 mmol/L Final    Lithium Dose Amount 02/06/2023 Unknown   Final            Medications  Current Facility-Administered Medications: dexmethylphenidate (FOCALIN) tablet 10 mg, 10 mg, Oral, BID  acetaminophen (TYLENOL) tablet 650 mg, 650 mg, Oral, Q4H PRN  magnesium hydroxide (MILK OF MAGNESIA) 400 MG/5ML suspension 30 mL, 30 mL, Oral, Daily PRN  nicotine polacrilex (COMMIT) lozenge 2 mg, 2 mg, Oral, Q1H PRN  aluminum & magnesium hydroxide-simethicone (MAALOX) 200-200-20 MG/5ML suspension 30 mL, 30 mL, Oral, Q6H PRN  hydrOXYzine HCl (ATARAX) tablet 50 mg, 50 mg, Oral, TID PRN  OLANZapine (ZYPREXA) tablet 5 mg, 5 mg, Oral, Q4H PRN **OR** OLANZapine (ZYPREXA) 10 mg in sterile water 2 mL injection, 10 mg, IntraMUSCular, Q4H PRN  diphenhydrAMINE (BENADRYL) injection 50 mg, 50 mg, IntraMUSCular, Q4H PRN  traZODone (DESYREL) tablet 50 mg, 50 mg, Oral, Nightly PRN  cetirizine (ZYRTEC) tablet 10 mg, 10 mg, Oral, Daily  levothyroxine (SYNTHROID) tablet 75 mcg, 75 mcg, Oral, QAM AC  lithium (ESKALITH) extended release tablet 450 mg, 450 mg, Oral, BID  pantoprazole (PROTONIX) tablet 40 mg, 40 mg, Oral, QAM AC  OXcarbazepine (TRILEPTAL) tablet 300 mg, 300 mg, Oral, BID  oxybutynin (DITROPAN) tablet 2.5 mg, 2.5 mg, Oral, BID  traZODone (DESYREL) tablet 25 mg, 25 mg, Oral, Nightly  risperiDONE (RISPERDAL) tablet 2 mg, 2 mg, Oral, Nightly  cloNIDine (CATAPRES) tablet 0.2 mg, 0.2 mg, Oral, Nightly  ibuprofen (ADVIL;MOTRIN) tablet 400 mg, 400 mg, Oral, BID    ASSESSMENT AND PLAN    Principal Problem:    Psychosis, unspecified psychosis type (HCC)  Active Problems:    [place] disease ([facility] [address]    Paranoid delusion ([facility] [address]    Attention deficit hyperactivity disorder (ADHD), predominantly inattentive type    Encounter for routine adult medical examination    Chronic cough    Asthma without acute exacerbation    Hypothyroidism    Gastroesophageal reflux disease  Resolved Problems:    * No resolved hospital problems. *       1. Patient s symptoms   are improving  2. Probable discharge is 1-2 d  3. Discharge planning is complete  4. Suicidal ideation is  none  5.  Total time with patient was 50 minutes and more than 50 % of that time was spent counseling the patient on their symptoms, treatment and expected goals.       Chitra Peña MD  Physician Psychiatry

## 2023-02-08 NOTE — FLOWSHEET NOTE
Purposeful Rounding    Patient Location: Day room    Patient willing to engage in conversation: Yes    Presentation/behavior: Anxious, Cooperative, and Pleasant    Affect: Brightens with interaction    Concerns reported: no  concerns, pt states he always has high anxiety, hyperverbal    PRN medications given: n/a    Environmental assessment: Room free from clutter, Clear path to bathroom , and Adequate lighting    Fall prevention interventions in place: Yellow non-skid socks on    Daily Ramirez Fall Risk Score: 0-low      Electronically signed by Meño Childers RN on 2/8/23 at 8:11 AM EST

## 2023-02-08 NOTE — PROGRESS NOTES
Reassessment of PRN Tylenol. Patient states his pain has improve to a 3 which is tolerable. He states this is chronic arthritis pain so it doesn't ever go away. He did state the Tylenol was effective.

## 2023-02-08 NOTE — PLAN OF CARE
Problem: Pain  Goal: Verbalizes/displays adequate comfort level or baseline comfort level  Outcome: Progressing     Problem: Psychosis  Goal: Will report no hallucinations or delusions  Description: INTERVENTIONS:  1. Administer medication as  ordered  2. Assist with reality testing to support increasing orientation  3. Assess if patient's hallucinations or delusions are encouraging self harm or harm to others and intervene as appropriate  Outcome: Progressing     Problem: Anxiety  Goal: Will report anxiety at manageable levels  Description: INTERVENTIONS:  1. Administer medication as ordered  2. Teach and rehearse alternative coping skills  3.  Provide emotional support with 1:1 interaction with staff  Outcome: Progressing

## 2023-02-08 NOTE — GROUP NOTE
Group Therapy Note     Date: 2/8/2023     Group Start Time: 1300  Group End Time: 9314  Group Topic: Psychoeducation     [address] [place] [address]           Group Therapy Note     Attendees: 15     Facilitator led a group discussion on assertive communication. Patients discussed the importance of assertive communication and the four steps for accomplishing assertiveness. Group concluded by patients role-playing assertively communicating a specific need or want. Patient's Goal:  Patient will learn the basic steps of assertive communication and then be able to practice assertively expressing needs and wants with others. Notes:  Patient contributed to the group discussion and expressed understanding on assertive communication styles     Status After Intervention:  Improved     Participation Level:  Active Listener and Interactive     Participation Quality: Appropriate, Attentive, and Sharing        Speech:  normal        Thought Process/Content: Logical  Linear        Affective Functioning: Congruent        Mood: euthymic        Level of consciousness:  Alert, Oriented x4, and Attentive        Response to Learning: Able to verbalize current knowledge/experience, Able to verbalize/acknowledge new learning, Capable of insight, Able to change behavior, and Progressing to goal        Endings: None Reported     Modes of Intervention: Education, Support, and Exploration        Discipline Responsible: /Counselor        Signature:  ROMAN Jeff

## 2023-02-08 NOTE — BH NOTE
Writer was contacted by Dr. Elena Aranda, this patient's outpatient psychiatrist, and this writer provided update and DC expectations. Dr. Yasmani Bliss reports that he is going to request of [facility] to return to [facility] monthly from this patient's current [facility] every three months.     Rajiv Oseguera No

## 2023-02-08 NOTE — PROGRESS NOTES
Group Therapy Note    Date: 2/7/2023  Start Time: 20:00  End Time:  21:00  Number of Participants: 16    Type of Group: Recreational  wrap up    Wellness Binder Information  Module Name:  /  Session Number:  /    Patient's Goal:  Show down    Notes:  Continuing to work on goal    Status After Intervention:  Unchanged    Participation Level:  Active Listener and Interactive    Participation Quality: Appropriate, Attentive, and Sharing      Speech:  normal      Thought Process/Content: Logical      Affective Functioning: Exaggerated      Mood: anxious      Level of consciousness:  Alert and Attentive      Response to Learning: Able to change behavior      Endings: None Reported    Modes of Intervention: Socialization and Problem-solving      Discipline Responsible: Behavorial Health Tech      Signature:  Mindi Gonzalez

## 2023-02-08 NOTE — PROGRESS NOTES
Pt has been up ad marshall, visible in milieu. Hyper-verbal, hypermobile, and pressured speech have been continuous throughout the day. Pt is very pleasant and cooperative. He minimizes reasons as to why he is here. He reports that his grandmother worries too much and he is not supposed to be here. He denies all. Will continue to monitor for safety and comfort.

## 2023-02-09 VITALS
RESPIRATION RATE: 16 BRPM | DIASTOLIC BLOOD PRESSURE: 67 MMHG | BODY MASS INDEX: 20.84 KG/M2 | WEIGHT: 132.8 LBS | HEART RATE: 73 BPM | OXYGEN SATURATION: 100 % | SYSTOLIC BLOOD PRESSURE: 120 MMHG | TEMPERATURE: 97.5 F | HEIGHT: 67 IN

## 2023-02-09 LAB — OXCARBAZEPINE: 7 UG/ML (ref 3–35)

## 2023-02-09 PROCEDURE — 6370000000 HC RX 637 (ALT 250 FOR IP)

## 2023-02-09 PROCEDURE — 5130000000 HC BRIDGE APPOINTMENT

## 2023-02-09 RX ADMIN — LITHIUM CARBONATE 450 MG: 450 TABLET ORAL at 09:31

## 2023-02-09 RX ADMIN — OXCARBAZEPINE 300 MG: 300 TABLET, FILM COATED ORAL at 09:31

## 2023-02-09 RX ADMIN — ACETAMINOPHEN 650 MG: 325 TABLET ORAL at 09:31

## 2023-02-09 RX ADMIN — CETIRIZINE HYDROCHLORIDE 10 MG: 10 TABLET ORAL at 09:31

## 2023-02-09 RX ADMIN — OXYBUTYNIN CHLORIDE 2.5 MG: 5 TABLET ORAL at 09:34

## 2023-02-09 RX ADMIN — LEVOTHYROXINE SODIUM 75 MCG: 25 TABLET ORAL at 09:31

## 2023-02-09 RX ADMIN — PANTOPRAZOLE SODIUM 40 MG: 40 TABLET, DELAYED RELEASE ORAL at 09:31

## 2023-02-09 ASSESSMENT — PAIN DESCRIPTION - ONSET: ONSET: ON-GOING

## 2023-02-09 ASSESSMENT — PAIN DESCRIPTION - ORIENTATION: ORIENTATION: RIGHT

## 2023-02-09 ASSESSMENT — PAIN DESCRIPTION - DESCRIPTORS: DESCRIPTORS: ACHING

## 2023-02-09 ASSESSMENT — PAIN - FUNCTIONAL ASSESSMENT: PAIN_FUNCTIONAL_ASSESSMENT: ACTIVITIES ARE NOT PREVENTED

## 2023-02-09 ASSESSMENT — PAIN DESCRIPTION - LOCATION: LOCATION: KNEE

## 2023-02-09 ASSESSMENT — PAIN DESCRIPTION - FREQUENCY: FREQUENCY: CONTINUOUS

## 2023-02-09 ASSESSMENT — PAIN DESCRIPTION - PAIN TYPE: TYPE: CHRONIC PAIN

## 2023-02-09 ASSESSMENT — PAIN SCALES - GENERAL
PAINLEVEL_OUTOF10: 3
PAINLEVEL_OUTOF10: 6

## 2023-02-09 NOTE — PROGRESS NOTES
Patient complaining of increased right knee pain. This pain is chronic. Rates his pain a 6 on a 1-10 scale. Tylenol given per order.  See [facility]

## 2023-02-09 NOTE — GROUP NOTE
Group Therapy Note    Date: 2/9/2023    Group Start Time: 1000  Group End Time: 1050  Group Topic: Psychoeducation    [facility]    GABRIELLA Lux        Group Therapy Note  Clinician had the members review yesterday's group with assertive communication style prior to teaching them how to have difficult conversations using I statements. Attendees: 12       Patient's Goal:      Notes:  Patient was cooperative and fully engaged in the group discussion and activity. Patient used his example for the group to work out on the [employer]. Patient took notes and kept the I statement handout for practicing. Status After Intervention:  Improved    Participation Level:  Active Listener and Interactive    Participation Quality: Appropriate, Attentive, Sharing, and Supportive      Speech:  normal      Thought Process/Content: Logical      Affective Functioning: Congruent      Mood: anxious and fearful      Level of consciousness:  Attentive      Response to Learning: Able to verbalize current knowledge/experience      Endings: None Reported    Modes of Intervention: Education, Support, and Activity      Discipline Responsible: /Counselor      Signature:  GABRIELLA Lux

## 2023-02-09 NOTE — PROGRESS NOTES
Group Therapy Note    Date: 2/9/2023  Start Time: 1300  End Time:  1400  Number of Participants: 10    Type of Group: Music Group    Notes:  Pt present for Music Group. Pt actively participated by making song selections and singing along to music. Participation Level:  Active Listener and Interactive    Participation Quality: Appropriate and Attentive      Speech:  normal        Affective Functioning: Congruent      Endings: None Reported    Modes of Intervention: Support, Socialization, Activity, and Media      Discipline Responsible: Chaplain Cory Steiner       02/09/23 1449   Encounter Summary   Encounter Overview/Reason  Behavioral Health   Service Provided For: Patient   Last Encounter    (2/9 Music Group)   Complexity of Encounter Moderate   Begin Time 1300   End Time  1400   Total Time Calculated 60 min   Behavioral Health    Type  Spirituality Group

## 2023-02-09 NOTE — PLAN OF CARE
Problem: Psychosis  Goal: Will report no hallucinations or delusions  Description: INTERVENTIONS:  1. Administer medication as  ordered  2. Assist with reality testing to support increasing orientation  3. Assess if patient's hallucinations or delusions are encouraging self harm or harm to others and intervene as appropriate  2/8/2023 2249 by Elizabeth Perea RN  Outcome: Progressing  2/8/2023 1458 by Danny Dc RN  Outcome: Progressing     Problem: Anxiety  Goal: Will report anxiety at manageable levels  Description: INTERVENTIONS:  1. Administer medication as ordered  2. Teach and rehearse alternative coping skills  3.  Provide emotional support with 1:1 interaction with staff  2/8/2023 2249 by Elizabeth Perea RN  Outcome: Progressing  2/8/2023 1458 by Danny Dc RN  Outcome: Progressing

## 2023-02-09 NOTE — DISCHARGE INSTRUCTIONS
Advanced Directives:  Patient does not have a surrogate decision maker appointed   Name (if yes): N/A Phone Number: N/A  Patient does not have a psychiatric and/ or medical advanced directive or power of . Patient was offered psychiatric and/ or medical advanced directive or power of  information/completion but declined to complete   Why not? Declines    Discharge Planning is Complete. Discharge Date: 2/9/23  Reason for Hospitalization: Failure to care for self  Discharge Diagnosis: Psychosis, unspecified psychosis type [facility])  Discharging to: Private Domicile     Your instructions: Your physician here was Niall Carrion MD. If you have any questions please call the unit at [phone] for the adult unit or [phone] for [facility]. Please note that we have a patient family advisory [place] that meets the second Wednesday of January and the second Wednesday of July at [address] [facility] in [place] at [facility]. Department leadership would love for you to attend to give feedback on what we are doing well and areas in which we can improve our patient care. Please come if you are able and feel free to reach out to [facility] at [phone] with any questions. The crisis number for [facility] is [phone] ([facility]). This crisis line is available 24 hours a day, seven days a week. Please follow up with your PCP regarding any pending labs. [address] [employer] has contacted your  and made her aware of your admission and discharge from the hospital. She will visit you in your home for care coordination of services tomorrow.   Name of Provider: Mary Alice Foster  Provider specialty/license: [id number]  Date and time of appointment: 2/10/2023 in the afternoon  The type/s of services requested are: Counseling/Medication Management  Agency name: [facility]  Address:  [facility] [address]  Phone Number: [phone]  Special instructions (what to bring to appointment, etc.):     Discharge Completed By: GABRIELLA King  Fax to: Follow up provider name and number  [address] [facility]: [facility] [phone]      The following personal items were collected during your admission and were returned to you:    Belongings  Dental Appliances: None  Vision - Corrective Lenses: Eyeglasses  Hearing Aid: None  Clothing: Undergarments, Socks, Footwear, Pants, Shirt, Jacket/Coat  Jewelry: Necklace  Body Piercings Removed: N/A  Electronic Devices: , Cell Phone (rayovacs)  Weapons (Notify Protective Services/Security): None  Other Valuables: Wallet  Home Medications: Other (Comment) (meds in locker)  Valuables Given To: Other (Comment) (wallet cards money in safe)  Provide Name(s) of Who Valuable(s) Were Given To: None    By signing below, I understand and acknowledge receipt of the instructions indicated above.

## 2023-02-09 NOTE — PLAN OF CARE
[facility]  Discharge Note    Pt discharged with followings belongings:   Dental Appliances: None  Vision - Corrective Lenses: Eyeglasses  Hearing Aid: None  Jewelry: Necklace  Body Piercings Removed: N/A  Clothing: Undergarments, Socks, Footwear, Pants, Shirt, Jacket/Coat  Other Valuables: [address] sent home with or returned to patient. Patient educated on aftercare instructions: yes  Information faxed to by Charge RN  at 4:00 PM .Patient verbalize understanding of AVS:  yes. Status EXAM upon discharge:  Mental Status and Behavioral Exam  Normal: No  Level of Assistance: Independent/Self  Facial Expression: Elevated, Euphoric  Affect: Unstable  Level of Consciousness: Alert  Frequency of Checks: 4 times per hour, close  Mood:Normal: No  Mood: Anxious  Motor Activity:Normal: No  Motor Activity: Increased  Eye Contact: Good  Observed Behavior: Friendly, Cooperative, Hypermobile  Sexual Misconduct History: Current - no  Preception: [place] to person, [place] to time, [place] to place  Attention:Normal: No  Attention: Unable to concentrate  Thought Processes: Circumstantial, Flight of ideas  Thought Content:Normal: No  Thought Content: Poverty of content  Depression Symptoms: No problems reported or observed. Anxiety Symptoms: Generalized  Patti Symptoms: Flight of ideas, Less need to sleep, Pressured speech, Increased energy  Hallucinations: None  Delusions: No  Delusions:  Other (comment) (no paranoid statements made)  Memory:Normal: No  Memory: Confabulation, Poor recent  Insight and Judgment: No  Insight and Judgment: Poor judgment, Poor insight    Tobacco Screening:  Practical Counseling, on admission, brooke X, if applicable and completed (first 3 are required if patient doesn't refuse):            ( ) Recognizing danger situations (included triggers and roadblocks)                    ( ) Coping skills (new ways to manage stress,relaxation techniques, changing routine, distraction) ( ) Basic information about quitting (benefits of quitting, techniques in how to quit, available resources  ( ) Referral for counseling faxed to [facility]                                                                                                                   ( ) Patient refused counseling  ( ) Patient refused referral  ( ) Patient refused prescription upon discharge  ( x) Patient has not smoked in the last 30 days    Metabolic Screening:    No results found for: LABA1C    Lab Results   Component Value Date    CHOL 125 02/06/2023     Lab Results   Component Value Date    TRIG 177 (H) 02/06/2023     Lab Results   Component Value Date    HDL 45 02/06/2023     No components found for: [facility] [place]  Lab Results   Component Value Date    LABVLDL 35 02/06/2023     Bridge Appointment completed: Reviewed Discharge Instructions with patient. Patient verbalizes understanding and agreement with the discharge plan using the teachback method.        Vaccinations (brooke X if applicable and completed):  ( ) Patient states already received influenza vaccine elsewhere  ( ) Patient received influenza vaccine during this hospitalization  ( x) Patient refused influenza vaccine at this time     Socorro Pyle RN

## 2023-02-09 NOTE — PROGRESS NOTES
Pt in dayroom at beginning of shift coloring. He falls asleep from 2015 to 2230. Pt then wakes up and is hyper verbal and pacing unit. Pt has flight of ideas and is focused on martial arts training. He remains pleasant and cooperative. Pt does report he should not be here and he will be leaving soon. Shows some insight r/t luiza's dx as patient reports he cant talk and walk the best right now because of his brain disease. Pt denies pain and denies current SI while CFS. No s/s of RTIS noted. He takes his evening meds and paces more then eventually lays back down and goes to sleep.

## 2023-02-09 NOTE — DISCHARGE SUMMARY
Discharge Summary   Admit Date: 2/6/2023   Discharge Date:    2/9/2023  Condition at DC stable  Spent over 40 minutes with patient and staff on [facility] with more than 50 % of time spent with patient discussing care  Final Dx: axis I: Psychosis, unspecified psychosis type ([address]                                     Axis 2: No diagnosis  [place] 3: See Medical History    And Present on Admission:   Psychosis, unspecified psychosis type ([address]   Cement disease ([address]   Paranoid delusion ([address]   Attention deficit hyperactivity disorder (ADHD), predominantly inattentive type   Encounter for routine adult medical examination   Chronic cough   Asthma without acute exacerbation   Hypothyroidism   Gastroesophageal reflux disease     Axis 4: Problems related to the social environment  Axis 5:  On Admission: 31-40 impairment in reality testing At Discharge: 61-70 mild symptoms   All conditions on Axis 1 and Axis 2 and active problems on Axis 3 were treated while patient was hospitalized.  [facility] Problems    Diagnosis Date Noted    Paranoid delusion ([address] [F22] 02/07/2023     Priority: Medium    Attention deficit hyperactivity disorder (ADHD), predominantly inattentive type [F90.0] 02/07/2023     Priority: Medium    Encounter for routine adult medical examination [Z00.00] 02/07/2023     Priority: Medium    Chronic cough [R05.3] 02/07/2023     Priority: Medium    Asthma without acute exacerbation [J45.909] 02/07/2023     Priority: Medium    Hypothyroidism [E03.9] 02/07/2023     Priority: Medium    Gastroesophageal reflux disease [K21.9] 02/07/2023     Priority: Medium    Psychosis, unspecified psychosis type ([address] [F29] 02/06/2023     Priority: Medium    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 HISTORY OF PRESENT ILLNESS:  The patient is a 15-year-old male who has a  history of Alberts[employer]'s disease, who presented to the [facility] and then SOB  after he was brought in by [employer]. According to report, he was  appearing paranoid and delusional at his grandmother's home where he  helps take care of her at times. He thinks his house is bugged and  people are out to get him and apparently had barricaded himself and his  grandmother in the apartment without leave or answering the telephone. He is followed by a variety of different people including  Neurology  and Psychiatry and has also consulted with [facility] where he sees Everlean Bickers as well as a therapist.     Of note, the patient denies any of these behaviors that were described. The [facility] also spoke with the patient's grandmother, Charlyn Schilder, and she  stated that he has been escalating over the past few days and has become  increasingly paranoid, argumentative, and delusional.  He tells her that  someone is coming to get him so he has been putting chairs under the  door knobs and locking them. Apparently, he would not let her have  access to a phone or leave the home. He also thinks someone has stolen  his medications and has not been taking care of his basic needs. Again,  he is in total denial of all of these statements that his grandmother  made. When I met with him today, he was cooperative and pleasant. He appeared  to be easily distracted and had some difficulty processing which may be  related to his Morgan's disease. He stated he was diagnosed when he  was 25years of age and suffers at times memory loss. He states his  mother also  from [de-identified] at age 37. He does not believe he  needs to be here and was hopeful that he could go home today.   He was  asking about his psychiatrist and neurologist, and I stated that I had  not had any contact with them. Hospital Course  Patient stabilized on meds and milieu treatment. No evidence of psychosis nor paranoid thoughts during his stay. No aggression while on unit. Denied any threats to harm grandmother and is planning to visit with her after DC. Patient was discharged to home to continue recovery in the community.    PE: (reviewed) and labs (see medical H&PE)  Labs:    Admission on 02/06/2023   Component Date Value Ref Range Status    WBC 02/06/2023 6.0  4.0 - 11.0 K/uL Final    RBC 02/06/2023 4.09 (A)  4.20 - 5.90 M/uL Final    Hemoglobin 02/06/2023 12.2 (A)  13.5 - 17.5 g/dL Final    Hematocrit 02/06/2023 35.8 (A)  40.5 - 52.5 % Final    MCV 02/06/2023 87.7  80.0 - 100.0 fL Final    MCH 02/06/2023 29.9  26.0 - 34.0 pg Final    MCHC 02/06/2023 34.1  31.0 - 36.0 g/dL Final    RDW 02/06/2023 12.7  12.4 - 15.4 % Final    Platelets 52/66/3948 261  135 - 450 K/uL Final    MPV 02/06/2023 8.4  5.0 - 10.5 fL Final    Neutrophils % 02/06/2023 65.7  % Final    Lymphocytes % 02/06/2023 26.3  % Final    Monocytes % 02/06/2023 5.2  % Final    Eosinophils % 02/06/2023 2.3  % Final    Basophils % 02/06/2023 0.5  % Final    Neutrophils Absolute 02/06/2023 3.9  1.7 - 7.7 K/uL Final    Lymphocytes Absolute 02/06/2023 1.6  1.0 - 5.1 K/uL Final    Monocytes Absolute 02/06/2023 0.3  0.0 - 1.3 K/uL Final    Eosinophils Absolute 02/06/2023 0.1  0.0 - 0.6 K/uL Final    Basophils Absolute 02/06/2023 0.0  0.0 - 0.2 K/uL Final    Sodium 02/06/2023 137  136 - 145 mmol/L Final    Potassium reflex Magnesium 02/06/2023 3.4 (A)  3.5 - 5.1 mmol/L Final    Chloride 02/06/2023 103  99 - 110 mmol/L Final    CO2 02/06/2023 27  21 - 32 mmol/L Final    Anion Gap 02/06/2023 7  3 - 16 Final    Glucose 02/06/2023 111 (A)  70 - 99 mg/dL Final    BUN 02/06/2023 9  7 - 20 mg/dL Final    Creatinine 02/06/2023 0.9  0.9 - 1.3 mg/dL Final    Est, Glom Filt Rate 02/06/2023 >60  >60 Final    Comment: Pediatric calculator link  Devyn.at. org/professionals/kdoqi/gfr_calculatorped  Effective Oct 3, 2022  These results are not intended for use in patients  <25years of age. eGFR results are calculated without  a race factor using the 2021 CKD-EPI equation. Careful  clinical correlation is recommended, particularly when  comparing to results calculated using previous equations. The CKD-EPI equation is less accurate in patients with  extremes of muscle mass, extra-renal metabolism of  creatinine, excessive creatinine ingestion, or following  therapy that affects renal tubular secretion. Calcium 02/06/2023 8.9  8.3 - 10.6 mg/dL Final    Total Protein 02/06/2023 6.3 (A)  6.4 - 8.2 g/dL Final    Albumin 02/06/2023 4.6  3.4 - 5.0 g/dL Final    Albumin/Globulin Ratio 02/06/2023 2.7 (A)  1.1 - 2.2 Final    Total Bilirubin 02/06/2023 <0.2  0.0 - 1.0 mg/dL Final    Alkaline Phosphatase 02/06/2023 112  40 - 129 U/L Final    ALT 02/06/2023 17  10 - 40 U/L Final    AST 02/06/2023 14 (A)  15 - 37 U/L Final    Salicylate, Serum 28/04/1068 <0.3 (A)  15.0 - 30.0 mg/dL Final    Comment: Therapeutic Range: 15.0-30.0 mg/dL  Toxic: >30.0 mg/dL      Acetaminophen Level 02/06/2023 <5 (A)  10 - 30 ug/mL Final    Comment: Therapeutic Range: 10.0-30.0 ug/mL  Toxic: >=150 ug/mL      Ethanol Lvl 02/06/2023 None Detected  mg/dL Final    Comment:    None Detected  Conversion factor:  100 mg/dl = .100 g/dl  For Medical Purposes Only      SARS-CoV-2 RNA, RT PCR 02/06/2023 NOT DETECTED  NOT DETECTED Final    Comment: Not Detected results do not preclude SARS-CoV-2 infection and  should not be used as the sole basis for patient management  decisions. Results must be combined with clinical observations,  patient history, and epidemiological information. Testing was performed using PASQUALE TIM SARS-CoV-2 and Influenza A/B  nucleic acid assay.  This test is a multiplex Real-Time Reverse  Transcriptase Polymerase Chain Reaction (RT-PCR)-based in vitro  diagnostic test intended for the qualitative detection of nucleic  acids from SARS-CoV-2, influenza A, and influenza B in nasopharyngeal  and nasal swab specimens for use under the FDAs Emergency Use  Authorization (EUA) only. Patient Fact Sheet:  FindDrives.pl  Provider Fact Sheet: FindDrives.pl  EUA: FindDrives.pl  IFU: FindDrives.pl    Methodology:  RT-PCR      INFLUENZA A 02/06/2023 NOT DETECTED  NOT DETECTED Final    INFLUENZA B 02/06/2023 NOT DETECTED  NOT DETECTED Final    Amphetamine Screen, Urine 02/06/2023 Neg  Negative <1000ng/mL Final    Barbiturate Screen, Ur 02/06/2023 Neg  Negative <200 ng/mL Final    Benzodiazepine Screen, Urine 02/06/2023 Neg  Negative <200 ng/mL Final    Cannabinoid Scrn, Ur 02/06/2023 Neg  Negative <50 ng/mL Final    Cocaine Metabolite Screen, Urine 02/06/2023 Neg  Negative <300 ng/mL Final    Opiate Scrn, Ur 02/06/2023 Neg  Negative <300 ng/mL Final    Comment: \"Therapeutic levels of pain medication, especially oxycontin and synthetic  opioids, may not be detected by this Methodology. Pain management screen  panel  Drug panel-PM-Hi Res Ur, Interp (PAIN) should be considered for drug  monitoring \". PCP Screen, Urine 02/06/2023 Neg  Negative <25 ng/mL Final    Methadone Screen, Urine 02/06/2023 Neg  Negative <300 ng/mL Final    Oxycodone Urine 02/06/2023 Neg  Negative <100 ng/ml Final    FENTANYL SCREEN, URINE 02/06/2023 Neg  Negative <5 ng/mL Final    pH, UA 02/06/2023 7.0   Final    Comment: Urine pH less than 5.0 or greater than 8.0 may indicate sample adulteration. Another sample should be collected if clinically  indicated. Drug Screen Comment: 02/06/2023 see below   Final    Comment: This method is a screening test to detect only these drug  classes as part of a medical workup.   Confirmatory testing  by another method should be ordered if clinically indicated. Magnesium 02/06/2023 2.10  1.80 - 2.40 mg/dL Final    Cholesterol, Total 02/06/2023 125  0 - 199 mg/dL Final    Triglycerides 02/06/2023 177 (A)  0 - 150 mg/dL Final    HDL 02/06/2023 45  40 - 60 mg/dL Final    Comment: An HDL cholesterol less than 40 mg/dL is low and  constitutes a coronary heart disease risk factor. An HDL cholesterol greater than 60 mg/dL is a  negative risk factor for coronary heart disease. LDL Calculated 02/06/2023 45  <100 mg/dL Final    VLDL Cholesterol Calculated 02/06/2023 35  Not Established mg/dL Final    TSH 02/06/2023 0.91  0.27 - 4.20 uIU/mL Final    [place] Lvl 02/06/2023 0.8  0.6 - 1.2 mmol/L Final    Lithium Dose Amount 02/06/2023 Unknown   Final        Mental Status Exam at Discharge:  Level of consciousness:  awake  Appearance:  well-appearing, in chair, good grooming and good hygiene well-developed, well-nourished  Behavior/Motor:  no abnormalities noted normal gait and station AIMS: 0  Attitude toward examiner:  cooperative, attentive and good eye contact  Speech:  spontaneous, normal rate, normal volume and well articulated  Mood:  dysthymic  Affect:  mood congruent Anxiety: mild  Hallucinations: Absent  Thought processes:  coherent Attention span, Concentration & Attention:  attention span and concentration were age appropriate  Thought content:   no evidence of delusions OCD: none    Insight: normal insight and judgment Cognition:  oriented to person, place, and time  Fund of Knowledge: average  IQ:average Memory: intact  Suicide:  No specific plan to harm self  Sleep: sleeps through the night  Appetite: ok   Reassess [place] Risk:  no specific plan to harm self Pt has phone numbers to contact if suicidal thoughts recur and states pt will return to the hospital if suicidal feelings return.    Hospital Routine Meds:     dexmethylphenidate  10 mg Oral BID    cetirizine  10 mg Oral Daily    levothyroxine  75 mcg Oral QAM AC    lithium  450 mg Oral BID    pantoprazole  40 mg Oral QAM AC    OXcarbazepine  300 mg Oral BID    oxybutynin  2.5 mg Oral BID    traZODone  25 mg Oral Nightly    risperiDONE  2 mg Oral Nightly    cloNIDine  0.2 mg Oral Nightly    ibuprofen  400 mg Oral BID      Hospital PRN Meds: acetaminophen, magnesium hydroxide, nicotine polacrilex, aluminum & magnesium hydroxide-simethicone, hydrOXYzine HCl, OLANZapine **OR** OLANZapine (ZyPREXA) in sterile water IntraMUSCular, diphenhydrAMINE, traZODone   Discharge Meds:    Current Discharge Medication List             Details   ibuprofen (ADVIL;MOTRIN) 400 MG tablet Take 400 mg by mouth 2 times daily      omeprazole (PRILOSEC) 20 MG delayed release capsule Take 20 mg by mouth every morning (before breakfast)      cloNIDine (CATAPRES) 0.2 MG tablet Take 0.2 mg by mouth daily      levothyroxine (SYNTHROID) 75 MCG tablet Take 75 mcg by mouth daily      lithium (ESKALITH) 450 MG extended release tablet Take 450 mg by mouth 2 times daily Patient reported that he  takes the lithium every morning & at 6 pm      risperiDONE (RISPERDAL) 2 MG tablet Take 2 mg by mouth daily      OXcarbazepine (TRILEPTAL) 300 MG tablet Take 300 mg by mouth 2 times daily Patient reported taking med every morning & at 6 pm      cetirizine (ZYRTEC) 10 MG tablet Take 10 mg by mouth daily      traZODone (DESYREL) 50 MG tablet Take 25 mg by mouth nightly      oxybutynin (DITROPAN) 5 MG tablet Take 2.5 mg by mouth 2 times daily      Dexmethylphenidate HCl (FOCALIN PO) Take 20 mg by mouth daily               Disposition - Residence Home     Follow Up:  See Discharge Instructions     Jemal Alvarado MD  Physician Psychiatry

## 2023-02-09 NOTE — CARE COORDINATION
[facility]  Treatment Team Note  Day 3    Review Date & Time: 0900  2/9/2023    Patient was not in treatment team      Status EXAM:   Mental Status and Behavioral Exam  Normal: No  Level of Assistance: Independent/Self  Facial Expression: Elevated, Euphoric  Affect: Unstable  Level of Consciousness: Alert  Frequency of Checks: 4 times per hour, close  Mood:Normal: No  Mood: Anxious  Motor Activity:Normal: No  Motor Activity: Increased  Eye Contact: Good  Observed Behavior: Friendly, Cooperative, Hypermobile  Sexual Misconduct History: Current - no  Preception: [place] to person, [place] to time, [place] to place  Attention:Normal: No  Attention: Unable to concentrate  Thought Processes: Circumstantial, Flight of ideas  Thought Content:Normal: No  Thought Content: Poverty of content  Depression Symptoms: No problems reported or observed. Anxiety Symptoms: Generalized  Patti Symptoms: Flight of ideas, Less need to sleep, Pressured speech, Increased energy  Hallucinations: None  Delusions: No  Delusions: Other (comment) (no paranoid statements made)  Memory:Normal: No  Memory: Confabulation, Poor recent  Insight and Judgment: No  Insight and Judgment: Poor judgment, Poor insight      Suicide Risk CSSR-S:  1) Within the past month, have you wished you were dead or wished you could go to sleep and not wake up? : No  2) Have you actually had any thoughts of killing yourself? : No  6) Have you ever done anything, started to do anything, or prepared to do anything to end your life?: No      PLAN/TREATMENT RECOMMENDATIONS UPDATE: Patient will take medication as prescribed, eat 75% of meals, attend groups, participate in milieu activities, participate in treatment team and care planning for discharge and follow up.       Rajinder Tate RN

## 2023-02-09 NOTE — PROGRESS NOTES
Pt has a medication bubble pack with various medications. There were 5 Focalin within the pack, count verified with Anjel Serna RN. The pack is in the lockbox in the medication room.

## 2023-02-09 NOTE — FLOWSHEET NOTE
Purposeful Rounding    Patient Location: Patient room    Patient willing to engage in conversation: No    Presentation/behavior: Other resting*    Affect: Neutral/Euthymic(normal)    Concerns reported: none at this time, scheduled 0700 medications held d/t patient sleeping    PRN medications given: n/a    Environmental assessment: Room free from clutter, Clear path to bathroom , and Adequate lighting    Fall prevention interventions in place: Yellow non-skid socks on      Daily Ramirez Fall Risk Score: 0-low      Electronically signed by Duke Holloway RN on 2/9/23 at 7:41 AM EST

## 2023-03-09 PROBLEM — Z00.00 ENCOUNTER FOR ROUTINE ADULT MEDICAL EXAMINATION: Status: RESOLVED | Noted: 2023-02-07 | Resolved: 2023-03-09

## 2023-06-23 ENCOUNTER — HOSPITAL ENCOUNTER (EMERGENCY)
Age: 28
Discharge: HOME | End: 2023-06-23
Payer: MEDICAID

## 2023-06-23 ENCOUNTER — APPOINTMENT (OUTPATIENT)
Dept: GENERAL RADIOLOGY | Age: 28
End: 2023-06-23
Payer: MEDICAID

## 2023-06-23 VITALS
OXYGEN SATURATION: 99 % | SYSTOLIC BLOOD PRESSURE: 166 MMHG | BODY MASS INDEX: 22.76 KG/M2 | DIASTOLIC BLOOD PRESSURE: 89 MMHG | TEMPERATURE: 98 F | WEIGHT: 145 LBS | RESPIRATION RATE: 16 BRPM | HEIGHT: 67 IN | HEART RATE: 68 BPM

## 2023-06-23 DIAGNOSIS — R55 VASOVAGAL SYNCOPE: Primary | ICD-10-CM

## 2023-06-23 DIAGNOSIS — K21.9 GASTROESOPHAGEAL REFLUX DISEASE WITHOUT ESOPHAGITIS: ICD-10-CM

## 2023-06-23 LAB
ALBUMIN SERPL-MCNC: 4.4 G/DL (ref 3.4–5)
ALBUMIN/GLOB SERPL: 1.8 {RATIO} (ref 1.1–2.2)
ALP SERPL-CCNC: 77 U/L (ref 40–129)
ALT SERPL-CCNC: 26 U/L (ref 10–40)
AMPHETAMINES UR QL SCN>1000 NG/ML: NORMAL
ANION GAP SERPL CALCULATED.3IONS-SCNC: 8 MMOL/L (ref 3–16)
AST SERPL-CCNC: 19 U/L (ref 15–37)
BARBITURATES UR QL SCN>200 NG/ML: NORMAL
BASOPHILS # BLD: 0 K/UL (ref 0–0.2)
BASOPHILS NFR BLD: 0.4 %
BENZODIAZ UR QL SCN>200 NG/ML: NORMAL
BILIRUB SERPL-MCNC: <0.2 MG/DL (ref 0–1)
BILIRUB UR QL STRIP.AUTO: NEGATIVE
BUN SERPL-MCNC: 14 MG/DL (ref 7–20)
CALCIUM SERPL-MCNC: 9.7 MG/DL (ref 8.3–10.6)
CANNABINOIDS UR QL SCN>50 NG/ML: NORMAL
CHLORIDE SERPL-SCNC: 104 MMOL/L (ref 99–110)
CLARITY UR: CLEAR
CO2 SERPL-SCNC: 25 MMOL/L (ref 21–32)
COCAINE UR QL SCN: NORMAL
COLOR UR: YELLOW
CREAT SERPL-MCNC: 1.2 MG/DL (ref 0.9–1.3)
DEPRECATED RDW RBC AUTO: 12.5 % (ref 12.4–15.4)
DRUG SCREEN COMMENT UR-IMP: NORMAL
EKG ATRIAL RATE: 51 BPM
EKG DIAGNOSIS: NORMAL
EKG P AXIS: 48 DEGREES
EKG P-R INTERVAL: 170 MS
EKG Q-T INTERVAL: 408 MS
EKG QRS DURATION: 100 MS
EKG QTC CALCULATION (BAZETT): 376 MS
EKG R AXIS: 78 DEGREES
EKG T AXIS: 71 DEGREES
EKG VENTRICULAR RATE: 51 BPM
EOSINOPHIL # BLD: 0.1 K/UL (ref 0–0.6)
EOSINOPHIL NFR BLD: 0.5 %
FENTANYL SCREEN, URINE: NORMAL
GFR SERPLBLD CREATININE-BSD FMLA CKD-EPI: >60 ML/MIN/{1.73_M2}
GLUCOSE SERPL-MCNC: 106 MG/DL (ref 70–99)
GLUCOSE UR STRIP.AUTO-MCNC: NEGATIVE MG/DL
HCT VFR BLD AUTO: 38 % (ref 40.5–52.5)
HGB BLD-MCNC: 12.9 G/DL (ref 13.5–17.5)
HGB UR QL STRIP.AUTO: NEGATIVE
KETONES UR STRIP.AUTO-MCNC: NEGATIVE MG/DL
LEUKOCYTE ESTERASE UR QL STRIP.AUTO: NEGATIVE
LIPASE SERPL-CCNC: 22 U/L (ref 13–60)
LYMPHOCYTES # BLD: 1.3 K/UL (ref 1–5.1)
LYMPHOCYTES NFR BLD: 13.8 %
MCH RBC QN AUTO: 29.7 PG (ref 26–34)
MCHC RBC AUTO-ENTMCNC: 33.9 G/DL (ref 31–36)
MCV RBC AUTO: 87.6 FL (ref 80–100)
METHADONE UR QL SCN>300 NG/ML: NORMAL
MONOCYTES # BLD: 0.5 K/UL (ref 0–1.3)
MONOCYTES NFR BLD: 5.7 %
NEUTROPHILS # BLD: 7.6 K/UL (ref 1.7–7.7)
NEUTROPHILS NFR BLD: 79.6 %
NITRITE UR QL STRIP.AUTO: NEGATIVE
OPIATES UR QL SCN>300 NG/ML: NORMAL
OXYCODONE UR QL SCN: NORMAL
PCP UR QL SCN>25 NG/ML: NORMAL
PH UR STRIP.AUTO: 6 [PH] (ref 5–8)
PH UR STRIP: 6 [PH]
PLATELET # BLD AUTO: 215 K/UL (ref 135–450)
PMV BLD AUTO: 8.8 FL (ref 5–10.5)
POTASSIUM SERPL-SCNC: 4.9 MMOL/L (ref 3.5–5.1)
PROT SERPL-MCNC: 6.8 G/DL (ref 6.4–8.2)
PROT UR STRIP.AUTO-MCNC: NEGATIVE MG/DL
RBC # BLD AUTO: 4.34 M/UL (ref 4.2–5.9)
SODIUM SERPL-SCNC: 137 MMOL/L (ref 136–145)
SP GR UR STRIP.AUTO: 1.02 (ref 1–1.03)
UA COMPLETE W REFLEX CULTURE PNL UR: NORMAL
UA DIPSTICK W REFLEX MICRO PNL UR: NORMAL
URN SPEC COLLECT METH UR: NORMAL
UROBILINOGEN UR STRIP-ACNC: 0.2 E.U./DL
WBC # BLD AUTO: 9.6 K/UL (ref 4–11)

## 2023-06-23 PROCEDURE — 93010 ELECTROCARDIOGRAM REPORT: CPT | Performed by: INTERNAL MEDICINE

## 2023-06-23 PROCEDURE — 2580000003 HC RX 258: Performed by: PHYSICIAN ASSISTANT

## 2023-06-23 PROCEDURE — 93005 ELECTROCARDIOGRAM TRACING: CPT | Performed by: PHYSICIAN ASSISTANT

## 2023-06-23 PROCEDURE — 36415 COLL VENOUS BLD VENIPUNCTURE: CPT

## 2023-06-23 PROCEDURE — 6360000002 HC RX W HCPCS: Performed by: PHYSICIAN ASSISTANT

## 2023-06-23 PROCEDURE — 99285 EMERGENCY DEPT VISIT HI MDM: CPT

## 2023-06-23 PROCEDURE — A4216 STERILE WATER/SALINE, 10 ML: HCPCS | Performed by: PHYSICIAN ASSISTANT

## 2023-06-23 PROCEDURE — 80307 DRUG TEST PRSMV CHEM ANLYZR: CPT

## 2023-06-23 PROCEDURE — 83690 ASSAY OF LIPASE: CPT

## 2023-06-23 PROCEDURE — 96375 TX/PRO/DX INJ NEW DRUG ADDON: CPT

## 2023-06-23 PROCEDURE — 96374 THER/PROPH/DIAG INJ IV PUSH: CPT

## 2023-06-23 PROCEDURE — 85025 COMPLETE CBC W/AUTO DIFF WBC: CPT

## 2023-06-23 PROCEDURE — 80053 COMPREHEN METABOLIC PANEL: CPT

## 2023-06-23 PROCEDURE — 71045 X-RAY EXAM CHEST 1 VIEW: CPT

## 2023-06-23 PROCEDURE — 81003 URINALYSIS AUTO W/O SCOPE: CPT

## 2023-06-23 PROCEDURE — 2500000003 HC RX 250 WO HCPCS: Performed by: PHYSICIAN ASSISTANT

## 2023-06-23 RX ORDER — ONDANSETRON 2 MG/ML
4 INJECTION INTRAMUSCULAR; INTRAVENOUS ONCE
Status: COMPLETED | OUTPATIENT
Start: 2023-06-23 | End: 2023-06-23

## 2023-06-23 RX ORDER — 0.9 % SODIUM CHLORIDE 0.9 %
1000 INTRAVENOUS SOLUTION INTRAVENOUS ONCE
Status: COMPLETED | OUTPATIENT
Start: 2023-06-23 | End: 2023-06-23

## 2023-06-23 RX ORDER — FAMOTIDINE 20 MG/1
20 TABLET, FILM COATED ORAL 2 TIMES DAILY
Qty: 60 TABLET | Refills: 0 | Status: SHIPPED | OUTPATIENT
Start: 2023-06-23

## 2023-06-23 RX ADMIN — FAMOTIDINE 20 MG: 10 INJECTION, SOLUTION INTRAVENOUS at 14:22

## 2023-06-23 RX ADMIN — SODIUM CHLORIDE 1000 ML: 9 INJECTION, SOLUTION INTRAVENOUS at 14:23

## 2023-06-23 RX ADMIN — ONDANSETRON 4 MG: 2 INJECTION INTRAMUSCULAR; INTRAVENOUS at 14:22

## 2023-06-23 ASSESSMENT — PAIN - FUNCTIONAL ASSESSMENT: PAIN_FUNCTIONAL_ASSESSMENT: NONE - DENIES PAIN

## 2023-06-23 NOTE — ED PROVIDER NOTES
[address] ED  EMERGENCY DEPARTMENT ENCOUNTER        Pt Name: Ofe Trammell  MRN: [id number]  [place] 1995  Date of evaluation: 6/23/2023  Provider: Audi Templeton PA-C  PCP: Cloteal Drafts, APRN - CNP  Note Started: 2:02 PM EDT 6/23/23      GUSTAVO. I have evaluated this patient. CHIEF COMPLAINT       Chief Complaint   Patient presents with    Other     Pt. Reports anxiety with blood draws. Was at PCP when he had near syncopal episode. HISTORY OF PRESENT ILLNESS: 1 or more Elements     History From: Patient and     Ofe Trammell is a 33 y.o. male who presents to the emergency department with his . The patient went to get his monthly Invega injection. It was scheduled 11 AM.  They checked his A1c by fingerstick. He had a lightheaded vasovagal response and went out briefly. He was pale and diaphoretic. Since he has regained his awareness. He is conversant and alert. His heart rate is 58 and his BP is 93/57. He has no physical complaints. He is brought in to be checked out. He has never had this happen previously. He reports no lightheadedness, dizziness, vertigo, chest pain, shortness of breath. He does report for the past week he has had some epigastric discomfort with nausea without emesis or change in bowel pattern. No urinary complaints. Nursing Notes were all reviewed and agreed with or any disagreements were addressed in the HPI. REVIEW OF SYSTEMS :      Review of Systems    Positives and Pertinent negatives as per HPI.      SURGICAL HISTORY     Past Surgical History:   Procedure Laterality Date    HERNIA REPAIR         CURRENTMEDICATIONS       Previous Medications    CETIRIZINE (ZYRTEC) 10 MG TABLET    Take 10 mg by mouth daily    CLONIDINE (CATAPRES) 0.2 MG TABLET    Take 0.2 mg by mouth daily    DEXMETHYLPHENIDATE HCL (FOCALIN PO)    Take 20 mg by mouth daily    IBUPROFEN (ADVIL;MOTRIN) 400 MG TABLET    Take 400 mg by

## 2023-06-23 NOTE — ED PROVIDER NOTES
I did not perform a face-to-face evaluation of this patient. I was available for consultation as needed I did interpret the patient's EKG as noted below: The Ekg interpreted by me shows  sinus bradycardia, rate=51    Axis is   Normal  QTc is  normal  Intervals and Durations are unremarkable.       ST Segments: no acute change  No significant change from prior EKG dated 4/28/21         Ranulfo Flaherty MD  06/23/23 [id number]

## 2023-06-23 NOTE — DISCHARGE INSTRUCTIONS
I do believe response related to fingerstick. Laboratory studies normal or within normal limits. All studies were normal or within normal limits. IV fluids given in ED. Stand intermittent nausea and epigastric discomfort. Stop omeprazole and start Pepcid 20 mg take this twice daily. Avoid foods that trigger nausea and/or epigastric abdominal pain. Follow-up with your healthcare provider. Consider GI consultation.